# Patient Record
Sex: MALE | Race: ASIAN | Employment: OTHER | URBAN - METROPOLITAN AREA
[De-identification: names, ages, dates, MRNs, and addresses within clinical notes are randomized per-mention and may not be internally consistent; named-entity substitution may affect disease eponyms.]

---

## 2017-01-09 ENCOUNTER — OFFICE VISIT (OUTPATIENT)
Dept: SURGERY | Facility: CLINIC | Age: 82
End: 2017-01-09

## 2017-01-09 VITALS
DIASTOLIC BLOOD PRESSURE: 74 MMHG | SYSTOLIC BLOOD PRESSURE: 122 MMHG | HEIGHT: 66 IN | WEIGHT: 127 LBS | BODY MASS INDEX: 20.41 KG/M2 | RESPIRATION RATE: 16 BRPM | TEMPERATURE: 97 F | HEART RATE: 66 BPM

## 2017-01-09 DIAGNOSIS — D72.819 LEUKOPENIA, UNSPECIFIED TYPE: ICD-10-CM

## 2017-01-09 DIAGNOSIS — D61.818 PANCYTOPENIA (HCC): ICD-10-CM

## 2017-01-09 DIAGNOSIS — I10 ESSENTIAL HYPERTENSION WITH GOAL BLOOD PRESSURE LESS THAN 130/80: ICD-10-CM

## 2017-01-09 DIAGNOSIS — N18.5 CKD (CHRONIC KIDNEY DISEASE), STAGE V (HCC): ICD-10-CM

## 2017-01-09 DIAGNOSIS — E78.2 MIXED HYPERLIPIDEMIA: ICD-10-CM

## 2017-01-09 DIAGNOSIS — N18.6 ESRD (END STAGE RENAL DISEASE) (HCC): ICD-10-CM

## 2017-01-09 DIAGNOSIS — R80.9 NEPHROTIC RANGE PROTEINURIA: ICD-10-CM

## 2017-01-09 DIAGNOSIS — E03.9 HYPOTHYROIDISM, UNSPECIFIED TYPE: ICD-10-CM

## 2017-01-09 DIAGNOSIS — N05.2 MEMBRANOUS GLOMERULONEPHRITIS: ICD-10-CM

## 2017-01-09 DIAGNOSIS — K40.90 INGUINAL HERNIA OF RIGHT SIDE WITHOUT OBSTRUCTION OR GANGRENE: Primary | ICD-10-CM

## 2017-01-09 DIAGNOSIS — D69.6 THROMBOCYTOPENIA (HCC): ICD-10-CM

## 2017-01-09 PROCEDURE — 99204 OFFICE O/P NEW MOD 45 MIN: CPT | Performed by: COLON & RECTAL SURGERY

## 2017-01-09 NOTE — PROGRESS NOTES
Follow Up Visit Note       Active Problems      1. Inguinal hernia of right side without obstruction or gangrene    2. Membranous glomerulonephritis    3. Nephrotic range proteinuria    4. Pancytopenia ([address]    5. Thrombocytopenia ([address]    6.  ESRD (end sta platelets 698. My total face time with this patient was 45 minutes. Greater than half of our visit was spent in counseling the patient on the above listed diagnoses and treatment options. Allergies  Dayne has No Known Allergies.     Past M Constitutional: Negative for fever, chills, diaphoresis, fatigue and unexpected weight change. HENT: Negative for hearing loss, nosebleeds, sore throat and trouble swallowing. Respiratory: Negative for apnea, cough, shortness of breath and wheezing. is definitely bowel contents palpable. Bowel sounds have normal activity normal pitch. Genitourinary: Penis normal. Right testis shows no mass, no swelling and no tenderness. Right testis is descended.  Left testis shows no mass, no swelling and no t that is reducible. The patient is scheduled for an AV fistula later this week. He has been diagnosed with pancytopenia. He has hypertension. 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     Clinical examination reveals his abdomen to be soft, n

## 2017-01-11 ENCOUNTER — PRIOR ORIGINAL RECORDS (OUTPATIENT)
Dept: OTHER | Age: 82
End: 2017-01-11

## 2017-01-11 ENCOUNTER — SURGERY (OUTPATIENT)
Age: 82
End: 2017-01-11

## 2017-01-16 PROBLEM — K40.90 INGUINAL HERNIA OF RIGHT SIDE WITHOUT OBSTRUCTION OR GANGRENE: Status: ACTIVE | Noted: 2017-01-16

## 2017-01-16 NOTE — PATIENT INSTRUCTIONS
I am seeing this patient at the request of the primary care service for a giant right inguinal hernia. He has significant protrusion of the right groin region. He has significant discomfort at the site. He has right testicular pain.     He denies nause hernia with the Prolene system mesh. He should proceed with his arteriovenous fistula. We will schedule the operation here at [facility].  We have him scheduled for another office visit on January 30, 2017.   We will make our final decision ann

## 2017-02-03 ENCOUNTER — MYAURORA ACCOUNT LINK (OUTPATIENT)
Dept: OTHER | Age: 82
End: 2017-02-03

## 2017-02-03 ENCOUNTER — PRIOR ORIGINAL RECORDS (OUTPATIENT)
Dept: OTHER | Age: 82
End: 2017-02-03

## 2017-02-07 LAB
ALBUMIN: 2.3 G/DL
ALKALINE PHOSPHATATE(ALK PHOS): 72 IU/L
BILIRUBIN TOTAL: 0.4 MG/DL
BUN: 30 MG/DL
CALCIUM: 8.1 MG/DL
CHLORIDE: 100 MEQ/L
CREATININE, SERUM: 3.61 MG/DL
GLUCOSE: 80 MG/DL
HEMATOCRIT: 39 %
HEMOGLOBIN: 12.8 G/DL
PLATELETS: 183 K/UL
POTASSIUM, SERUM: 3.3 MEQ/L
PROTEIN, TOTAL: 6.7 G/DL
RED BLOOD COUNT: 4.04 X 10-6/U
SGOT (AST): 20 IU/L
SGPT (ALT): 18 IU/L
SODIUM: 139 MEQ/L
WHITE BLOOD COUNT: 4.9 X 10-3/U

## 2017-02-18 ENCOUNTER — HOSPITAL ENCOUNTER (OUTPATIENT)
Dept: LAB | Facility: HOSPITAL | Age: 82
Discharge: HOME | End: 2017-02-18
Attending: INTERNAL MEDICINE
Payer: MEDICARE

## 2017-02-18 ENCOUNTER — PRIOR ORIGINAL RECORDS (OUTPATIENT)
Dept: OTHER | Age: 82
End: 2017-02-18

## 2017-02-18 ENCOUNTER — HOSPITAL ENCOUNTER (OUTPATIENT)
Dept: CV DIAGNOSTICS | Facility: HOSPITAL | Age: 82
Discharge: HOME | End: 2017-02-18
Attending: INTERNAL MEDICINE
Payer: MEDICARE

## 2017-02-18 DIAGNOSIS — I50.9 CHF (CONGESTIVE HEART FAILURE) (HCC): ICD-10-CM

## 2017-02-18 LAB
ALT SERPL-CCNC: 18 U/L (ref 17–63)
AST SERPL-CCNC: 20 U/L (ref 15–41)
FREE T4: 1.3 NG/DL (ref 0.9–1.8)
TSI SER-ACNC: 7.41 MIU/ML (ref 0.35–5.5)

## 2017-02-18 PROCEDURE — 93017 CV STRESS TEST TRACING ONLY: CPT

## 2017-02-18 PROCEDURE — 84439 ASSAY OF FREE THYROXINE: CPT | Performed by: INTERNAL MEDICINE

## 2017-02-18 PROCEDURE — 84450 TRANSFERASE (AST) (SGOT): CPT | Performed by: INTERNAL MEDICINE

## 2017-02-18 PROCEDURE — 78452 HT MUSCLE IMAGE SPECT MULT: CPT

## 2017-02-18 PROCEDURE — 78452 HT MUSCLE IMAGE SPECT MULT: CPT | Performed by: INTERNAL MEDICINE

## 2017-02-18 PROCEDURE — 36415 COLL VENOUS BLD VENIPUNCTURE: CPT | Performed by: INTERNAL MEDICINE

## 2017-02-18 PROCEDURE — 84460 ALANINE AMINO (ALT) (SGPT): CPT | Performed by: INTERNAL MEDICINE

## 2017-02-18 PROCEDURE — 84443 ASSAY THYROID STIM HORMONE: CPT | Performed by: INTERNAL MEDICINE

## 2017-02-18 PROCEDURE — 93018 CV STRESS TEST I&R ONLY: CPT | Performed by: INTERNAL MEDICINE

## 2017-02-21 LAB
ALT (SGPT): 18 U/L
AST (SGOT): 20 U/L
FREE T4: 1.3 MG/DL
THYROID STIMULATING HORMONE: 7.41 MLU/L

## 2017-02-23 ENCOUNTER — PRIOR ORIGINAL RECORDS (OUTPATIENT)
Dept: OTHER | Age: 82
End: 2017-02-23

## 2017-03-06 PROBLEM — I77.0 AVF (ARTERIOVENOUS FISTULA) (HCC): Status: ACTIVE | Noted: 2017-03-06

## 2017-03-30 ENCOUNTER — TELEPHONE (OUTPATIENT)
Dept: INTERNAL MEDICINE CLINIC | Facility: CLINIC | Age: 82
End: 2017-03-30

## 2017-03-30 RX ORDER — LEVOTHYROXINE SODIUM 137 UG/1
1 CAPSULE ORAL DAILY
Qty: 90 CAPSULE | Refills: 1 | Status: SHIPPED | OUTPATIENT
Start: 2017-03-30 | End: 2017-04-29

## 2017-03-30 NOTE — TELEPHONE ENCOUNTER
Patient is doing pretty good according to daughter, but the hospital had mentioned that thyroid lab was high . She is calling to inquire what should be done. Patients last visit with Dr Alley Pearce was 12/16/2016.   Advised patient should be seen by Dr Alley Pearce. Daughter

## 2017-04-06 ENCOUNTER — TELEPHONE (OUTPATIENT)
Dept: INTERNAL MEDICINE CLINIC | Facility: CLINIC | Age: 82
End: 2017-04-06

## 2017-04-06 NOTE — TELEPHONE ENCOUNTER
Changed the med from capsules to tablets to reduce cost at pharmacy, and conveyed the dose to the home health nurse

## 2017-05-12 RX ORDER — FOLIC ACID/VIT B COMPLEX AND C 0.8 MG
0.8 TABLET ORAL DAILY
Qty: 90 TABLET | Refills: 1 | Status: ON HOLD | OUTPATIENT
Start: 2017-05-12 | End: 2020-01-01

## 2017-06-19 ENCOUNTER — HOSPITAL ENCOUNTER (INPATIENT)
Facility: HOSPITAL | Age: 82
LOS: 1 days | Discharge: HOME | DRG: 299 | End: 2017-06-20
Attending: EMERGENCY MEDICINE | Admitting: HOSPITALIST
Payer: MEDICARE

## 2017-06-19 ENCOUNTER — APPOINTMENT (OUTPATIENT)
Dept: CT IMAGING | Facility: HOSPITAL | Age: 82
DRG: 299 | End: 2017-06-19
Attending: EMERGENCY MEDICINE
Payer: MEDICARE

## 2017-06-19 DIAGNOSIS — I82.B11 SUBCLAVIAN VEIN THROMBOEMBOLISM, ACUTE, RIGHT (HCC): Primary | ICD-10-CM

## 2017-06-19 PROBLEM — R73.9 HYPERGLYCEMIA: Status: ACTIVE | Noted: 2017-06-19

## 2017-06-19 PROBLEM — I82.B19 SUBCLAVIAN VEIN THROMBOEMBOLISM, ACUTE (HCC): Status: ACTIVE | Noted: 2017-06-19

## 2017-06-19 PROBLEM — N17.9 ACUTE KIDNEY INJURY (HCC): Status: ACTIVE | Noted: 2017-06-19

## 2017-06-19 PROBLEM — D64.9 ANEMIA: Status: ACTIVE | Noted: 2017-06-19

## 2017-06-19 PROBLEM — N17.9 ACUTE RENAL FAILURE (ARF) (HCC): Status: ACTIVE | Noted: 2017-06-19

## 2017-06-19 PROCEDURE — 71260 CT THORAX DX C+: CPT | Performed by: EMERGENCY MEDICINE

## 2017-06-20 ENCOUNTER — APPOINTMENT (OUTPATIENT)
Dept: CV DIAGNOSTICS | Facility: HOSPITAL | Age: 82
DRG: 299 | End: 2017-06-20
Attending: HOSPITALIST
Payer: MEDICARE

## 2017-06-20 VITALS
TEMPERATURE: 98 F | HEIGHT: 66 IN | DIASTOLIC BLOOD PRESSURE: 63 MMHG | OXYGEN SATURATION: 95 % | BODY MASS INDEX: 21.36 KG/M2 | SYSTOLIC BLOOD PRESSURE: 133 MMHG | RESPIRATION RATE: 18 BRPM | WEIGHT: 132.94 LBS | HEART RATE: 71 BPM

## 2017-06-20 PROBLEM — I82.B11: Status: ACTIVE | Noted: 2017-06-20

## 2017-06-20 PROCEDURE — 99222 1ST HOSP IP/OBS MODERATE 55: CPT | Performed by: INTERNAL MEDICINE

## 2017-06-20 PROCEDURE — 93306 TTE W/DOPPLER COMPLETE: CPT | Performed by: HOSPITALIST

## 2017-06-20 PROCEDURE — 99223 1ST HOSP IP/OBS HIGH 75: CPT | Performed by: HOSPITALIST

## 2017-06-20 RX ORDER — HEPARIN SODIUM AND DEXTROSE 10000; 5 [USP'U]/100ML; G/100ML
18 INJECTION INTRAVENOUS ONCE
Status: COMPLETED | OUTPATIENT
Start: 2017-06-20 | End: 2017-06-20

## 2017-06-20 RX ORDER — ASCORBIC ACID, THIAMINE, RIBOFLAVIN, NIACINAMIDE, PYRIDOXINE, FOLIC ACID, COBALAMIN, BIOTIN, PANTOTHENIC ACID 100; 1.5; 1.7; 20; 10; 1; 6; 300; 1 MG/1; MG/1; MG/1; MG/1; MG/1; MG/1; UG/1; UG/1; MG/1
1 TABLET, COATED ORAL DAILY
Status: DISCONTINUED | OUTPATIENT
Start: 2017-06-20 | End: 2017-06-20

## 2017-06-20 RX ORDER — ACETAMINOPHEN 325 MG/1
650 TABLET ORAL EVERY 6 HOURS PRN
Status: DISCONTINUED | OUTPATIENT
Start: 2017-06-20 | End: 2017-06-20

## 2017-06-20 RX ORDER — ONDANSETRON 2 MG/ML
4 INJECTION INTRAMUSCULAR; INTRAVENOUS EVERY 6 HOURS PRN
Status: DISCONTINUED | OUTPATIENT
Start: 2017-06-20 | End: 2017-06-20

## 2017-06-20 RX ORDER — HEPARIN SODIUM AND DEXTROSE 10000; 5 [USP'U]/100ML; G/100ML
INJECTION INTRAVENOUS CONTINUOUS
Status: DISCONTINUED | OUTPATIENT
Start: 2017-06-20 | End: 2017-06-20

## 2017-06-20 RX ORDER — HEPARIN SODIUM 5000 [USP'U]/ML
80 INJECTION INTRAVENOUS; SUBCUTANEOUS ONCE
Status: COMPLETED | OUTPATIENT
Start: 2017-06-20 | End: 2017-06-20

## 2017-06-20 RX ORDER — ATORVASTATIN CALCIUM 40 MG/1
40 TABLET, FILM COATED ORAL NIGHTLY
Status: DISCONTINUED | OUTPATIENT
Start: 2017-06-20 | End: 2017-06-20

## 2017-06-20 RX ORDER — LEVOTHYROXINE SODIUM 0.12 MG/1
125 TABLET ORAL
Status: DISCONTINUED | OUTPATIENT
Start: 2017-06-20 | End: 2017-06-20

## 2017-06-20 RX ORDER — METOPROLOL SUCCINATE 50 MG/1
50 TABLET, EXTENDED RELEASE ORAL
Status: DISCONTINUED | OUTPATIENT
Start: 2017-06-20 | End: 2017-06-20

## 2017-06-20 NOTE — CONSULTS
Cardiology Consult Note     PRIMARY CARDIOLOGIST: BOB/SANJAY      CONSULT FOR: NONSUSTAINED VTACHY, HTN, HYPERCHOL, RENAL FAILURE ON DIALYSIS       HISTORY: 86 Y/O MALE WITH HX OF HTN, HYPERCHOL AND RENAL FAILURE ON DIALYSIS.  ADMIT FOR SUBCLAVIAN VEIN THRO

## 2017-06-20 NOTE — H&P
[facility]  History and Physical     Yisel Figures Patient Status:  Emergency    1932 MRN [id number]   Location [facility] Attending Severiano Ching, MD   Hosp Day # 0 PCP Kasandra Arana MD     Chief Complaint: Left Levothyroxine Sodium 125 MCG Oral Tab Take 1 tablet (125 mcg total) by mouth before breakfast. Disp: 90 tablet Rfl: 2   Metoprolol Succinate ER 50 MG Oral Tablet 24 Hr Take 1 tablet (50 mg total) by mouth daily.  Disp: 90 tablet Rfl: 0   HYDROcodone-aceta data reviewed in Epic. ASSESSMENT / PLAN:     1. Right subclavian DVT nonocclusive thrombus-start heparin IV and vascular surgery evaluation. 2. Recent right arm brachiocephalic AV fistula created for dialysis and recent swelling.   Fistulogram plans

## 2017-06-20 NOTE — PLAN OF CARE
Pt admitted at [place] [address]. Safety precautions initiated. Bed in low position. Call light within reach. Admission navigator completed. Daughter at bedside. Pt and daughter updated of plan of care. Will continue to monitor.

## 2017-06-20 NOTE — PROGRESS NOTES
Patient seen and examined. Medically clear to discharge today. Pt has no complaints. Discussed with vascular. Near occlusion likely chronic. No AC necessary. He has complete occlusion of inominate artery. Ok to resume HD using AVF of right arm.   Can d/c

## 2017-06-20 NOTE — CONSULTS
[facility]  Report of Consultation    Waqas Rojas Patient Status:  Inpatient    1932 MRN [id number]   [facility] 7NE-A Attending Roswell Sicard, MD   Hosp Day # 1 PCP Renea Myers MD       Assessment / Plan:    1) ESRD- due to bi any smokeless tobacco history on file.     Allergies:  No Known Allergies    Medications:    Current facility-administered medications:   •  atorvastatin (LIPITOR) tab 40 mg, 40 mg, Oral, Nightly  •  Levothyroxine Sodium (SYNTHROID, LEVOTHROID) tab 125 mcg, 06/20/2017    06/20/2017   CO2 28.0 06/20/2017   GLU 75 06/20/2017   CA 8.3 06/20/2017   ALB 2.3 06/19/2017   ALKPHO 70 06/19/2017   BILT 0.4 06/19/2017   TP 7.1 06/19/2017   AST 20 06/19/2017   ALT 32 06/19/2017   .8 06/20/2017   INR 1.08 06/

## 2017-06-20 NOTE — PROGRESS NOTES
Discharge instructions reviewed with patient and daughter. All questions were answered. Medications and follow-ups reviewed. All questions were answered concerning discharge plan. ACE wrap placed on right arm to help reduce swelling.  Encouraged not to slee

## 2017-06-20 NOTE — PROGRESS NOTES
PTT this .8. Per orders, hold for 1 hour, then decrease rate by 200 units. Will restart at 900 units.  PTT at 3pm

## 2017-06-20 NOTE — PROGRESS NOTES
Called daughter to update her on POC and inform her of discharge plan. No answer, but left voicemail and call back number.

## 2017-06-20 NOTE — ED PROVIDER NOTES
Patient Seen in: [facility] Emergency Department    History   Patient presents with:   Other    Stated Complaint: facial swelling    HPI    Patient is an 26-year-old with a history of hypertension, high cholesterol, chronic kidney disease, hypothyroi Take 1-2 tablets by mouth every 4 (four) hours as needed for Pain.        Family History   Problem Relation Age of Onset   • Hypertension Brother    • Other[other] [OTHER] Brother          Smoking Status: Former Smoker                   Packs/Day: 0.00  Yea within normal limits   CBC WITH DIFFERENTIAL WITH PLATELET    Narrative: The following orders were created for panel order CBC WITH DIFFERENTIAL WITH PLATELET.   Procedure                               Abnormality         Status                     ---- demonstrates nonocclusive thrombus in the right subclavian vein near the junction with the axillary vein likely secondary to his dialysis catheter. He is minimally fairly symptomatic even though the clot is nonocclusive.   Therefore he will be admitted and

## 2017-06-20 NOTE — CONSULTS
[facility]    PATIENT'S NAME: VALERIANO OLIVIER   ATTENDING PHYSICIAN: ROSEMARY Simsis: Heather Lofton M.D.    PATIENT ACCOUNT#:   [de-identified]    LOCATION:  [address] [place] [address]  MEDICAL RECORD #:   [id number]       DATE OF BIRTH:  0 Insignificant for early CAD, per records. REVIEW OF SYSTEMS:  No chest pain. No shortness of breath. Supine in bed not short of breath. He has edematous right arm but no swelling of the lower extremities.   The rest of the review of systems negative x ordered. Thank you very much for allowing me to participate in the care of this patient.     Dictated By Angel Malcolm M.D.  d: 06/20/2017 11:38:18  t: 06/20/2017 13:14:31  [facility] [id number]  TJD/

## 2017-06-20 NOTE — CM/SW NOTE
06/20/17 1100   CM/SW Referral Data   Referral Source Family   Reason for Referral Discharge planning   Informant Children   Pertinent Medical Hx   Primary Care Physician Name Dr Venecia Morris   Date of Last Contact with PCP 4/20/2017   Significant Past Medical/Me

## 2017-06-20 NOTE — ED INITIAL ASSESSMENT (HPI)
Pt to ED brought in by daughter since they were sent by Orquidea Claros - Dr. Layton Wallace to have his \"Angiogram expedited, since he needs it sooner than 6/28/17, as scheduled. \" Per daughter - Lynsey Doan, Pt has narrowing of his artery, thus needing a 2nd angiogram. Pt i

## 2017-06-20 NOTE — PLAN OF CARE
Received report at 0700. Patient alert and oriented x4. No complaints of pain. Primary language is Madarin, but does understand [place]. Heparin drip running at 900 units. PTT this .8. DVT protocol followed.  PTT redraw at 1500 (will update once resul

## 2017-06-20 NOTE — HISTORICAL OFFICE NOTE
MANJIT OLIVIER  : 1932  ACCOUNT:  [id number]  [phone]  PCP:      TODAY'S DATE: 2017  DICTATED BY:  Miah Ayon M.D.]    CHIEF COMPLAINT: [Cardiac Clearance.]    HPI:  [On 2017, Bianca Dejesus, an 80year old male, presented with no interim cardi venous pressure not elevated. RESP: respirations with normal rate and rhythm, clear to auscultation. GI: right inguinal hernia. MS: adequate gait for exercise/testing. EXT: AV fistula. SKIN: no rashes, lesions, ulcers.   NEURO/PSYCH: alert and oriented to

## 2017-06-20 NOTE — ED NOTES
Pt has a bed assigned since 2352, ER MD-Dr. Daniel Toscano made aware and stated Pt is not ready to be admitted at this time. Awaits Vascular Surgeon's callback and for possible anti-coagulation.

## 2017-06-20 NOTE — ED NOTES
Report given to Cayetano Wilson x [phone] at [phone]. Transport paged. Pt and daughter at bedside updated.

## 2017-06-20 NOTE — PROGRESS NOTES
Lab called concerning PTT >300. Informed her that drip was not held (as per protocol) so this is why it was probably high (false result).  Dr. Sr Small aware and okay to discharge

## 2017-06-20 NOTE — CONSULTS
[facility]  Vascular Surgery Consultation    Waqas Rojas Patient Status:  Inpatient    1932 MRN [id number]   [facility] 7NE-A Attending Roswell Sicard, MD   Hosp Day # 1 PCP Renea Myers MD     History of Present Illness:  Christopher High cholesterol    • Cancer [facility])      prostate   • Disorder of thyroid    • Dialysis patient [facility])    • AVF (arteriovenous fistula) ([facility] 3/6/2017         Past Surgical History    CATARACT       Family History   Problem Relation Age of Onset   • Hyperten Laboratory Data:    Lab Results  Component Value Date   WBC 6.1 06/19/2017   HGB 9.8 06/19/2017   HCT 31.0 06/19/2017   .0 06/19/2017   CREATSERUM 7.01 06/20/2017   BUN 82 06/20/2017    06/20/2017   K 4.0 06/20/2017    06/20/2017 well controlled with the sleeve.   The patient could consider ligation of his fistula which would make his arm normal.  This however would require a temporary dialysis catheter on the opposite side and a new fistula in the future this could put him at risk

## 2017-06-21 ENCOUNTER — PATIENT OUTREACH (OUTPATIENT)
Dept: CASE MANAGEMENT | Age: 82
End: 2017-06-21

## 2017-06-21 ENCOUNTER — TELEPHONE (OUTPATIENT)
Dept: INTERNAL MEDICINE CLINIC | Facility: CLINIC | Age: 82
End: 2017-06-21

## 2017-06-21 DIAGNOSIS — N18.6 ESRD (END STAGE RENAL DISEASE) (HCC): ICD-10-CM

## 2017-06-21 DIAGNOSIS — D63.1 ANEMIA IN CHRONIC KIDNEY DISEASE: Primary | ICD-10-CM

## 2017-06-21 DIAGNOSIS — N18.9 ANEMIA IN CHRONIC KIDNEY DISEASE: Primary | ICD-10-CM

## 2017-06-21 DIAGNOSIS — I82.B11 SUBCLAVIAN VEIN THROMBOEMBOLISM, ACUTE, RIGHT (HCC): ICD-10-CM

## 2017-06-21 DIAGNOSIS — I77.0 AVF (ARTERIOVENOUS FISTULA) (HCC): ICD-10-CM

## 2017-06-21 NOTE — PROGRESS NOTES
Initial Post Discharge Follow Up   Discharge Date: 6/20/17  Contact Date: 6/21/2017    Consent Verification:  Assessment Completed With: Other: Augusto patient's daughter Permission received per patient?  written  HIPAA Verified?   Yes    Discharge Dx: Metoprolol Succinate ER was changed to 12.5 mg daily. Message sent to PCP for clarification.   • When you were leaving the hospital were any medication changes discussed with you? yes  • May I go over your medications with you to make sure we are not adalid call the doctor and when to call 911. [facility] sent a message to PCP to clarify medication dosages of Levothyroxine and Metoprolol. NCM instructed pt's daughter to call PCP's office with any questions or needs, Augusto states she will.     [x]  Discharge [facility]

## 2017-06-21 NOTE — TELEPHONE ENCOUNTER
Patient discharged from [facility] on 6/20/17 per AVS patient is to be taking Levothyroxine Sodium 125 mcg 1 tab by mouth daily before breakfast and Metoprolol Succinate ER 50 mg take 1 tablet by mouth daily    Per Augusto (patient's daughter) burke

## 2017-06-21 NOTE — CM/SW NOTE
06/21/17 0900   Discharge disposition   Discharged to: Home or Self   Name of [address] [place] [address] services after discharge Senior services;Employed caregiver   Discharge transportation [place]

## 2017-06-22 NOTE — DISCHARGE SUMMARY
[facility]  DISCHARGE SUMMARY     Rose Ordaz Patient Status:  Inpatient    1932 MRN [id number]   [facility] 7NE-A Attending No att. providers found   Hosp Day # 1 PCP Martina Rodriguez MD     Date of Admission: 2017  Date of intervention. He had short burst of NSVT while inpatient and was seen by cardiology service. His echo was unchanged. He will continue on BB for now. He was stable for discharge home. He will continue to use his AVF for HD.   [facility]

## 2017-06-29 RX ORDER — LEVOTHYROXINE SODIUM 137 UG/1
1 CAPSULE ORAL
Qty: 30 CAPSULE | Refills: 0 | Status: SHIPPED | OUTPATIENT
Start: 2017-06-29 | End: 2017-07-29

## 2017-06-29 NOTE — TELEPHONE ENCOUNTER
Talked to daughter regarding father medical condition.  Due to long distance to [facility] and patient  needing frequent hospitalization at [facility], they are planning to switch PCP to Dr. Dayanna Frey

## 2017-08-02 RX ORDER — LEVOTHYROXINE SODIUM 125 UG/1
1 CAPSULE ORAL DAILY
Qty: 90 CAPSULE | Refills: 1 | Status: SHIPPED | OUTPATIENT
Start: 2017-08-02 | End: 2017-09-01

## 2017-08-03 ENCOUNTER — TELEPHONE (OUTPATIENT)
Dept: INTERNAL MEDICINE CLINIC | Facility: CLINIC | Age: 82
End: 2017-08-03

## 2017-12-16 ENCOUNTER — ANESTHESIA EVENT (OUTPATIENT)
Dept: SURGERY | Facility: HOSPITAL | Age: 82
DRG: 350 | End: 2017-12-16
Payer: MEDICARE

## 2017-12-16 ENCOUNTER — ANESTHESIA (OUTPATIENT)
Dept: SURGERY | Facility: HOSPITAL | Age: 82
DRG: 350 | End: 2017-12-16
Payer: MEDICARE

## 2017-12-16 ENCOUNTER — HOSPITAL ENCOUNTER (INPATIENT)
Facility: HOSPITAL | Age: 82
LOS: 2 days | Discharge: HOME | DRG: 350 | End: 2017-12-18
Attending: EMERGENCY MEDICINE | Admitting: STUDENT IN AN ORGANIZED HEALTH CARE EDUCATION/TRAINING PROGRAM
Payer: MEDICARE

## 2017-12-16 ENCOUNTER — SURGERY (OUTPATIENT)
Age: 82
End: 2017-12-16

## 2017-12-16 DIAGNOSIS — K40.90: ICD-10-CM

## 2017-12-16 DIAGNOSIS — K40.41: ICD-10-CM

## 2017-12-16 DIAGNOSIS — K40.31 RECURRENT UNILATERAL INGUINAL HERNIA WITH OBSTRUCTION AND WITHOUT GANGRENE: Primary | ICD-10-CM

## 2017-12-16 PROCEDURE — 0YU50JZ SUPPLEMENT RIGHT INGUINAL REGION WITH SYNTHETIC SUBSTITUTE, OPEN APPROACH: ICD-10-PCS | Performed by: SURGERY

## 2017-12-16 PROCEDURE — 99223 1ST HOSP IP/OBS HIGH 75: CPT | Performed by: STUDENT IN AN ORGANIZED HEALTH CARE EDUCATION/TRAINING PROGRAM

## 2017-12-16 PROCEDURE — 99223 1ST HOSP IP/OBS HIGH 75: CPT | Performed by: SURGERY

## 2017-12-16 DEVICE — BARD MESH PERFIX PLUG, LARGE
Type: IMPLANTABLE DEVICE | Site: INGUINAL | Status: FUNCTIONAL
Brand: BARD MESH PERFIX PLUG

## 2017-12-16 RX ORDER — MEPERIDINE HYDROCHLORIDE 25 MG/ML
12.5 INJECTION INTRAMUSCULAR; INTRAVENOUS; SUBCUTANEOUS AS NEEDED
Status: DISCONTINUED | OUTPATIENT
Start: 2017-12-16 | End: 2017-12-16 | Stop reason: HOSPADM

## 2017-12-16 RX ORDER — HYDROMORPHONE HYDROCHLORIDE 1 MG/ML
0.5 INJECTION, SOLUTION INTRAMUSCULAR; INTRAVENOUS; SUBCUTANEOUS EVERY 30 MIN PRN
Status: ACTIVE | OUTPATIENT
Start: 2017-12-16 | End: 2017-12-16

## 2017-12-16 RX ORDER — ONDANSETRON 2 MG/ML
4 INJECTION INTRAMUSCULAR; INTRAVENOUS EVERY 4 HOURS PRN
Status: DISCONTINUED | OUTPATIENT
Start: 2017-12-16 | End: 2017-12-18

## 2017-12-16 RX ORDER — SODIUM CHLORIDE 9 MG/ML
INJECTION, SOLUTION INTRAVENOUS CONTINUOUS
Status: ACTIVE | OUTPATIENT
Start: 2017-12-16 | End: 2017-12-16

## 2017-12-16 RX ORDER — HYDROCODONE BITARTRATE AND ACETAMINOPHEN 5; 325 MG/1; MG/1
2 TABLET ORAL AS NEEDED
Status: DISCONTINUED | OUTPATIENT
Start: 2017-12-16 | End: 2017-12-16 | Stop reason: HOSPADM

## 2017-12-16 RX ORDER — HYDROMORPHONE HYDROCHLORIDE 1 MG/ML
0.2 INJECTION, SOLUTION INTRAMUSCULAR; INTRAVENOUS; SUBCUTANEOUS EVERY 2 HOUR PRN
Status: DISCONTINUED | OUTPATIENT
Start: 2017-12-16 | End: 2017-12-18

## 2017-12-16 RX ORDER — ALBUTEROL SULFATE 2.5 MG/3ML
2.5 SOLUTION RESPIRATORY (INHALATION) ONCE AS NEEDED
Status: DISCONTINUED | OUTPATIENT
Start: 2017-12-16 | End: 2017-12-16 | Stop reason: HOSPADM

## 2017-12-16 RX ORDER — HYDROCODONE BITARTRATE AND ACETAMINOPHEN 5; 325 MG/1; MG/1
1 TABLET ORAL EVERY 4 HOURS PRN
Status: DISCONTINUED | OUTPATIENT
Start: 2017-12-16 | End: 2017-12-18

## 2017-12-16 RX ORDER — HYDROMORPHONE HYDROCHLORIDE 1 MG/ML
0.5 INJECTION, SOLUTION INTRAMUSCULAR; INTRAVENOUS; SUBCUTANEOUS
Status: DISCONTINUED | OUTPATIENT
Start: 2017-12-16 | End: 2017-12-18

## 2017-12-16 RX ORDER — LEVOTHYROXINE SODIUM 137 UG/1
137 TABLET ORAL
Status: ON HOLD | COMMUNITY
End: 2019-01-01

## 2017-12-16 RX ORDER — ONDANSETRON 2 MG/ML
4 INJECTION INTRAMUSCULAR; INTRAVENOUS EVERY 6 HOURS PRN
Status: DISCONTINUED | OUTPATIENT
Start: 2017-12-16 | End: 2017-12-18

## 2017-12-16 RX ORDER — HYDROMORPHONE HYDROCHLORIDE 1 MG/ML
INJECTION, SOLUTION INTRAMUSCULAR; INTRAVENOUS; SUBCUTANEOUS
Status: COMPLETED
Start: 2017-12-16 | End: 2017-12-16

## 2017-12-16 RX ORDER — HYDROCODONE BITARTRATE AND ACETAMINOPHEN 5; 325 MG/1; MG/1
1 TABLET ORAL AS NEEDED
Status: DISCONTINUED | OUTPATIENT
Start: 2017-12-16 | End: 2017-12-16 | Stop reason: HOSPADM

## 2017-12-16 RX ORDER — HYDROCODONE BITARTRATE AND ACETAMINOPHEN 5; 325 MG/1; MG/1
2 TABLET ORAL EVERY 4 HOURS PRN
Status: DISCONTINUED | OUTPATIENT
Start: 2017-12-16 | End: 2017-12-18

## 2017-12-16 RX ORDER — HEPARIN SODIUM 5000 [USP'U]/ML
5000 INJECTION, SOLUTION INTRAVENOUS; SUBCUTANEOUS EVERY 12 HOURS SCHEDULED
Status: DISCONTINUED | OUTPATIENT
Start: 2017-12-16 | End: 2017-12-18

## 2017-12-16 RX ORDER — HYDROMORPHONE HYDROCHLORIDE 1 MG/ML
0.8 INJECTION, SOLUTION INTRAMUSCULAR; INTRAVENOUS; SUBCUTANEOUS EVERY 2 HOUR PRN
Status: DISCONTINUED | OUTPATIENT
Start: 2017-12-16 | End: 2017-12-18

## 2017-12-16 RX ORDER — ONDANSETRON 2 MG/ML
4 INJECTION INTRAMUSCULAR; INTRAVENOUS AS NEEDED
Status: DISCONTINUED | OUTPATIENT
Start: 2017-12-16 | End: 2017-12-16 | Stop reason: HOSPADM

## 2017-12-16 RX ORDER — SODIUM CHLORIDE 9 MG/ML
INJECTION, SOLUTION INTRAVENOUS CONTINUOUS
Status: DISCONTINUED | OUTPATIENT
Start: 2017-12-16 | End: 2017-12-18

## 2017-12-16 RX ORDER — METOCLOPRAMIDE HYDROCHLORIDE 5 MG/ML
10 INJECTION INTRAMUSCULAR; INTRAVENOUS AS NEEDED
Status: DISCONTINUED | OUTPATIENT
Start: 2017-12-16 | End: 2017-12-16 | Stop reason: HOSPADM

## 2017-12-16 RX ORDER — SODIUM CHLORIDE, SODIUM LACTATE, POTASSIUM CHLORIDE, CALCIUM CHLORIDE 600; 310; 30; 20 MG/100ML; MG/100ML; MG/100ML; MG/100ML
INJECTION, SOLUTION INTRAVENOUS CONTINUOUS
Status: DISCONTINUED | OUTPATIENT
Start: 2017-12-16 | End: 2017-12-16 | Stop reason: HOSPADM

## 2017-12-16 RX ORDER — HYDROMORPHONE HYDROCHLORIDE 1 MG/ML
0.4 INJECTION, SOLUTION INTRAMUSCULAR; INTRAVENOUS; SUBCUTANEOUS EVERY 2 HOUR PRN
Status: DISCONTINUED | OUTPATIENT
Start: 2017-12-16 | End: 2017-12-18

## 2017-12-16 RX ORDER — ACETAMINOPHEN 325 MG/1
650 TABLET ORAL EVERY 6 HOURS PRN
Status: DISCONTINUED | OUTPATIENT
Start: 2017-12-16 | End: 2017-12-18

## 2017-12-16 RX ORDER — METOCLOPRAMIDE HYDROCHLORIDE 5 MG/ML
5 INJECTION INTRAMUSCULAR; INTRAVENOUS EVERY 8 HOURS PRN
Status: DISCONTINUED | OUTPATIENT
Start: 2017-12-16 | End: 2017-12-18

## 2017-12-16 RX ORDER — BUPIVACAINE HYDROCHLORIDE 2.5 MG/ML
INJECTION, SOLUTION EPIDURAL; INFILTRATION; INTRACAUDAL AS NEEDED
Status: DISCONTINUED | OUTPATIENT
Start: 2017-12-16 | End: 2017-12-16 | Stop reason: HOSPADM

## 2017-12-16 RX ORDER — MIDAZOLAM HYDROCHLORIDE 1 MG/ML
1 INJECTION INTRAMUSCULAR; INTRAVENOUS EVERY 5 MIN PRN
Status: DISCONTINUED | OUTPATIENT
Start: 2017-12-16 | End: 2017-12-16 | Stop reason: HOSPADM

## 2017-12-16 RX ORDER — NALOXONE HYDROCHLORIDE 0.4 MG/ML
80 INJECTION, SOLUTION INTRAMUSCULAR; INTRAVENOUS; SUBCUTANEOUS AS NEEDED
Status: DISCONTINUED | OUTPATIENT
Start: 2017-12-16 | End: 2017-12-16 | Stop reason: HOSPADM

## 2017-12-16 RX ORDER — HYDROMORPHONE HYDROCHLORIDE 1 MG/ML
0.4 INJECTION, SOLUTION INTRAMUSCULAR; INTRAVENOUS; SUBCUTANEOUS EVERY 5 MIN PRN
Status: DISCONTINUED | OUTPATIENT
Start: 2017-12-16 | End: 2017-12-16 | Stop reason: HOSPADM

## 2017-12-16 RX ORDER — CEFOXITIN 1 G/1
INJECTION, POWDER, FOR SOLUTION INTRAVENOUS
Status: DISCONTINUED | OUTPATIENT
Start: 2017-12-16 | End: 2017-12-16 | Stop reason: HOSPADM

## 2017-12-16 RX ORDER — HYDROMORPHONE HYDROCHLORIDE 1 MG/ML
0.5 INJECTION, SOLUTION INTRAMUSCULAR; INTRAVENOUS; SUBCUTANEOUS ONCE
Status: COMPLETED | OUTPATIENT
Start: 2017-12-16 | End: 2017-12-16

## 2017-12-16 RX ORDER — ONDANSETRON 2 MG/ML
4 INJECTION INTRAMUSCULAR; INTRAVENOUS ONCE
Status: COMPLETED | OUTPATIENT
Start: 2017-12-16 | End: 2017-12-16

## 2017-12-16 NOTE — ANESTHESIA PREPROCEDURE EVALUATION
PRE-OP EVALUATION    Patient Name: Fariha Hendrickson    Pre-op Diagnosis: Hernia, inguinal, unilateral [K40.90]    Procedure(s):  Repair incarcerated unilateral hernia    Surgeon(s) and Role:     * Nova Caller, DO - Primary    Pre-op vitals reviewed.   Temp: 9 ([facility]     ESRD (end stage renal disease) ([facility]     Nephrotic range proteinuria     Inguinal hernia of right side without obstruction or gangrene     AVF (arteriovenous fistula) ([facility]     Anemia     Acute renal failure (ARF) (HCC)     Acute kidney injury (H

## 2017-12-16 NOTE — BRIEF OP NOTE
Pre-Operative Diagnosis: incarcerated right inguinal hernia     Post-Operative Diagnosis: incarcerated right inguinal hernia with ischemic small intestine     Procedure Performed:   Procedure(s):  Repair incarcerated right inguinal  with mesh    Surgeon

## 2017-12-16 NOTE — ED INITIAL ASSESSMENT (HPI)
Pt c/o mid abdominal pain since last night, vomiting this morning.   Pt has chronic diarrhea, pt saw his PMD today and sent to ER for further eval.

## 2017-12-16 NOTE — ED PROVIDER NOTES
Patient Seen in: [facility] Emergency Department    History   Patient presents with:  Abdomen/Flank Pain (GI/)  Nausea/Vomiting/Diarrhea (gastrointestinal)    Stated Complaint: abdominal pain, vomiting    HPI    80-year-old male who presents here bed holding a bucket due to vomiting. Complaining of groin pain. HEENT: Pupils are equal reactive to light. Extra ocular motions are intact. No scleral icterus or conjunctival pallor. Skin appears slightly pale.   Oral mucosa was moist.  Lungs: Clear t Glucose 159 BUN 49 creatinine of 5.5. Chloride of 99. White count 13,200. The patient appears to have an incarcerated and possibly strangulated hernia. I spoke to the patient and his daughter and told him of the need for emergent surgery.   The stephen

## 2017-12-16 NOTE — ED NOTES
Spoke to Abby Alves. Transport coming for patient in 15 minutes. Discussed POC with patients daughter.

## 2017-12-16 NOTE — CONSULTS
Ezio Morton is a 80year old male  Patient presents with:  Abdomen/Flank Pain (GI/)  Nausea/Vomiting/Diarrhea (gastrointestinal)      REFERRED BY    Patient presents with hernia Right inguinal present for over one year   Pt was previously able to reduce negative,   EYES , no diplopia or vision changes  RESPIRATORY: denies shortness of breath, wheezing or cough   CARDIOVASCULAR: denies chest pain or EDOUARD; no palpitations   GI: denies nausea, vomiting, constipation, diarrhea; no rectal bleeding; no heartburn mg/dL Final   • Alkaline Phosphatase 06/19/2017 70  45 - 117 U/L Final   • AST 06/19/2017 20  15 - 41 U/L Final   • Alt 06/19/2017 32  17 - 63 U/L Final   • Bilirubin, Total 06/19/2017 0.4  0.1 - 2.0 mg/dL Final   • Total Protein 06/19/2017 7.1  6.1 - 8.3 • BUN 06/20/2017 82* 8 - 20 mg/dL Final   • Creatinine 06/20/2017 7.01* 0.70 - 1.30 mg/dL Final   • GFR 06/20/2017 6* >=60 Final   • Calcium, Total 06/20/2017 8.3  8.3 - 10.3 mg/dL Final   • Sodium 06/20/2017 141  136 - 144 mmol/L Final   • Potassium 06/

## 2017-12-16 NOTE — H&P
[facility]  History and Physical     Yisel Figures Patient Status:  Emergency    1932 MRN [id number]   Location 6[facility] Attending Greta Remy MD   Hosp Day # 0 PCP Libra Tucker MD     Chief Complaint: [place] Ergocalciferol (VITAMIN D2) 2000 UNITS Oral Tab Take by mouth. Disp:  Rfl:    Atorvastatin Calcium 40 MG Oral Tab Take 1 tablet (40 mg total) by mouth nightly.  Disp: 90 tablet Rfl: 2       Review of Systems:   A comprehensive 14 point review of systems w care discussed with pt dtr.     Micah Busby MD  12/16/2017

## 2017-12-16 NOTE — ANESTHESIA PREPROCEDURE EVALUATION
PRE-OP EVALUATION    Patient Name: Waqas Bob    Pre-op Diagnosis: Hernia, inguinal, unilateral [K40.90]    Procedure(s):  Repair incarcerated unilateral hernia    Surgeon(s) and Role:     * Lan Manual, DO - Primary    Pre-op vitals reviewed.   Temp: 9 V ([facility] [address]     ESRD (end stage renal disease) ([address]     Nephrotic range proteinuria     Inguinal hernia of right side without obstruction or gangrene     AVF (arteriovenous fistula) ([address]     Anemia     Acute renal failure (ARF) (HCC)     Acute kidney injury injury,and hoarseness from airway management. All questions were answered and understanding was demonstrated of risks. Informed permission was obtained to proceed as documented in the signed consent form.       Patient overall has increased anesthesia ris

## 2017-12-17 PROCEDURE — 99222 1ST HOSP IP/OBS MODERATE 55: CPT | Performed by: INTERNAL MEDICINE

## 2017-12-17 PROCEDURE — 99232 SBSQ HOSP IP/OBS MODERATE 35: CPT | Performed by: STUDENT IN AN ORGANIZED HEALTH CARE EDUCATION/TRAINING PROGRAM

## 2017-12-17 RX ORDER — LIDOCAINE AND PRILOCAINE 25; 25 MG/G; MG/G
CREAM TOPICAL AS NEEDED
Status: DISCONTINUED | OUTPATIENT
Start: 2017-12-17 | End: 2017-12-18

## 2017-12-17 RX ORDER — ALBUMIN (HUMAN) 12.5 G/50ML
100 SOLUTION INTRAVENOUS AS NEEDED
Status: DISCONTINUED | OUTPATIENT
Start: 2017-12-17 | End: 2017-12-18

## 2017-12-17 NOTE — ANESTHESIA POSTPROCEDURE EVALUATION
[facility] Patient Status:  Emergency   Age/Gender 80year old male MRN [id number]   Location [facility] Attending [facility] Part, [facility] Day # 0 PCP Berna Kelley MD       Anesthesia Post-op Note    Proce

## 2017-12-17 NOTE — SLP NOTE
Orders were received for a bedside swallowing evaluation per RN navigator questions and patient being on a modified diet at home. After chart review and discussion with the RN, patient does not have a dysphagia.  He has poor fitting dentures and chooses to

## 2017-12-17 NOTE — CONSULTS
[facility]  Report of Consultation    Elena Simon Patient Status:  Inpatient    1932 MRN [id number]   [facility] 3NW-A Attending Shi Hermosillo, DO   Hosp Day # 1 PCP Jessica Sierra MD     Reason for Consultation:  ESRD    History of Metoclopramide HCl (REGLAN) injection 5 mg, 5 mg, Intravenous, Q8H PRN  •  acetaminophen (TYLENOL) tab 650 mg, 650 mg, Oral, Q6H PRN  •  ondansetron HCl (ZOFRAN) injection 4 mg, 4 mg, Intravenous, Q6H PRN  •  HYDROcodone-acetaminophen (NORCO) 5-325 MG per 06/20/2017 82 (H)   ----------  Creatinine (mg/dL)   Date Value   12/17/2017 6.75 (H)   12/16/2017 5.58 (H)   06/20/2017 7.01 (H)   ----------      Imaging:  Reviewed    Impression:  1. Incarcerated inguinal hernia - s/p repair; per surgery  2.  ESRD - re

## 2017-12-17 NOTE — OPERATIVE REPORT
[address] [place] [place]    PATIENT'S NAME: VALERIANO Tarango   ATTENDING PHYSICIAN: Rafael Peters D.O.   OPERATING PHYSICIAN: Rafael Peters D.O.   PATIENT ACCOUNT#:   [id number]    LOCATION:  [address] [place] [address]  MEDICAL RECORD #:   [id number]       DATE OF BIRTH:  [birth year] thin bloody fluid that was suctioned. The patient had evidence of some clots within the hernial sac and a single loop of small intestine which was brought up into the incision.   After the small intestine was released of its confining stricture at the exte

## 2017-12-17 NOTE — PROGRESS NOTES
[facility]  Progress Note     Zamzam Hummel Patient Status:  Inpatient    1932 MRN [id number]   [facility] 3NW-A Attending Luisa Barton,    Hosp Day # 1 PCP Chato Escobar MD     Chief Complaint: inguinal pain     S: Patient  Do Before breakfast       ASSESSMENT / PLAN:     1. Incarcerated hernia   1. Sx on cs- s/p repair POD 1  2. Anti emetics  3. IVF  4. Pain control  5. Adv diet as tolerated   2. ESRD on HD  1. Renal on cs   3. HTN  4.  HLD        Plan of care:   As above     Huma Goncalves

## 2017-12-17 NOTE — PROGRESS NOTES
HANDOFF RECEIVED ON PATIENT AT [address] [place] [address] FROM PEG ALLEN.  PATIENT IS SALINE LOCKED, ON ROOM AIR, PRIMARILY MANDARIN SPEAKING BUT DOES UNDERSTAND/SPEAK SOME ENGLISH, BED ALARM IN PLACE, ON ROOM AIR, ANURIC DUE TO HEMODIALYSIS, AV FISTULA POSITIVE FOR THRILL AND BRUI

## 2017-12-17 NOTE — PLAN OF CARE
GASTROINTESTINAL - ADULT    • Maintains or returns to baseline bowel function Progressing          METABOLIC/FLUID AND ELECTROLYTES - ADULT    • Electrolytes maintained within normal limits Progressing    • Hemodynamic stability and optimal renal function

## 2017-12-17 NOTE — PROGRESS NOTES
NURSING ADMISSION NOTE      Patient admitted via bed. Oriented to room. Safety precautions initiated. Bed in low position. Call light in reach. Patient reports he speaks Mandarin.  services used for assessment.  Writing RN spoke with

## 2017-12-17 NOTE — PROGRESS NOTES
Counts include 234 beds at the [facility] Pharmacy Note:  Renal Dose Adjustment for Metoclopramide (REGLAN)    Waqas Prudent has been prescribed Metoclopramide (REGLAN) 10 mg every 8 hours as needed for nausea and vomiting.     Estimated Creatinine Clearance: 8.6 mL/min (based on SCr of 5.58 mg/d

## 2017-12-17 NOTE — PROGRESS NOTES
[facility]  Progress Note    Sabi Silver Patient Status:  Inpatient    1932 MRN [id number]   [facility] 3NW-A Attending Rachid Koch, DO   Hosp Day # 1 PCP Mitch Mejia MD     Subjective:  Pt resting in bed, denies any pain, tani

## 2017-12-17 NOTE — PLAN OF CARE
GASTROINTESTINAL - ADULT    • Maintains or returns to baseline bowel function Progressing        METABOLIC/FLUID AND ELECTROLYTES - ADULT    • Electrolytes maintained within normal limits Progressing    • Hemodynamic stability and optimal renal function ma

## 2017-12-18 VITALS
RESPIRATION RATE: 16 BRPM | OXYGEN SATURATION: 95 % | SYSTOLIC BLOOD PRESSURE: 103 MMHG | WEIGHT: 136 LBS | BODY MASS INDEX: 22 KG/M2 | TEMPERATURE: 98 F | DIASTOLIC BLOOD PRESSURE: 48 MMHG | HEART RATE: 74 BPM

## 2017-12-18 PROBLEM — N18.6 ANEMIA IN ESRD (END-STAGE RENAL DISEASE) (HCC): Status: ACTIVE | Noted: 2017-06-19

## 2017-12-18 PROBLEM — D63.1 ANEMIA IN ESRD (END-STAGE RENAL DISEASE) (HCC): Status: ACTIVE | Noted: 2017-06-19

## 2017-12-18 PROCEDURE — 90935 HEMODIALYSIS ONE EVALUATION: CPT | Performed by: INTERNAL MEDICINE

## 2017-12-18 PROCEDURE — 99239 HOSP IP/OBS DSCHRG MGMT >30: CPT | Performed by: STUDENT IN AN ORGANIZED HEALTH CARE EDUCATION/TRAINING PROGRAM

## 2017-12-18 PROCEDURE — 5A1D70Z PERFORMANCE OF URINARY FILTRATION, INTERMITTENT, LESS THAN 6 HOURS PER DAY: ICD-10-PCS | Performed by: INTERNAL MEDICINE

## 2017-12-18 RX ORDER — DOCUSATE SODIUM 100 MG/1
100 CAPSULE, LIQUID FILLED ORAL DAILY
Qty: 30 CAPSULE | Refills: 0 | Status: SHIPPED | OUTPATIENT
Start: 2017-12-18 | End: 2019-01-01

## 2017-12-18 RX ORDER — HYDROCODONE BITARTRATE AND ACETAMINOPHEN 5; 325 MG/1; MG/1
1-2 TABLET ORAL
Qty: 30 TABLET | Refills: 0 | Status: SHIPPED | OUTPATIENT
Start: 2017-12-18 | End: 2019-01-01

## 2017-12-18 NOTE — DISCHARGE SUMMARY
[facility]  DISCHARGE SUMMARY     Socorro Shah Patient Status:  Inpatient    1932 MRN [id number]   [facility] 3NW-A Attending Shae Chong, DO   Hosp Day # 2 PCP Azael Boyd MD     Date of Admission: 2017  Date of [address]  Incidental or significant findings and recommendations (brief descriptions):  • no    Lab/Test results pending at Discharge:   · no    Consultants:  • Surgery    Discharge Medication List:     Discharge Medications      START taking these medications DO  [address] [place] [id number] [phone]    Schedule an appointment as soon as possible for a visit in 1 week  For post op follow up, You may see a PA (Severo Child or Harrison Comment)      Vital signs:  Temp:  [98 °F (36.7 °C)-99.5 °

## 2017-12-18 NOTE — PROGRESS NOTES
[facility]  Progress Note    Everett Warren Patient Status:  Inpatient    1932 MRN [id number]   [facility] 3NW-A Attending Gray Parker, DO   Hosp Day # 2 PCP Gudelia Zhou MD     Subjective:  Patient lying in bed, feeling well.  Pass

## 2017-12-18 NOTE — PROGRESS NOTES
[facility]  Nephrology Progress Note    Madi Mena Patient Status:  Inpatient    1932 MRN [id number]   [facility] 3NW-A Attending Sravanthi Verdugo, DO   Hosp Day # 2 PCP Berna Kelley MD       SUBJECTIVE:  Pt seen on dialysis, no acu Medications:  lidocaine-prilocaine (EMLA) cream  Topical PRN   Albumin Human (ALBUMINAR) 25 % solution 100 mL 100 mL Intravenous PRN   ondansetron HCl (ZOFRAN) injection 4 mg 4 mg Intravenous Q4H PRN   0.9%  NaCl infusion  Intravenous Continuous   Heparin

## 2017-12-18 NOTE — PLAN OF CARE
GASTROINTESTINAL - ADULT    • Maintains or returns to baseline bowel function Completed        METABOLIC/FLUID AND ELECTROLYTES - ADULT    • Electrolytes maintained within normal limits Completed    • Hemodynamic stability and optimal renal function mainta

## 2017-12-26 ENCOUNTER — OFFICE VISIT (OUTPATIENT)
Dept: SURGERY | Facility: CLINIC | Age: 82
End: 2017-12-26

## 2017-12-26 VITALS
BODY MASS INDEX: 21.86 KG/M2 | DIASTOLIC BLOOD PRESSURE: 77 MMHG | WEIGHT: 136 LBS | HEART RATE: 74 BPM | HEIGHT: 66 IN | SYSTOLIC BLOOD PRESSURE: 129 MMHG

## 2017-12-26 DIAGNOSIS — K40.31 RECURRENT UNILATERAL INGUINAL HERNIA WITH OBSTRUCTION AND WITHOUT GANGRENE: Primary | ICD-10-CM

## 2017-12-26 PROBLEM — K40.90 INGUINAL HERNIA OF RIGHT SIDE WITHOUT OBSTRUCTION OR GANGRENE: Status: RESOLVED | Noted: 2017-01-16 | Resolved: 2017-12-26

## 2017-12-26 PROBLEM — N17.9 ACUTE KIDNEY INJURY (HCC): Status: RESOLVED | Noted: 2017-06-19 | Resolved: 2017-12-26

## 2017-12-26 PROCEDURE — 99024 POSTOP FOLLOW-UP VISIT: CPT | Performed by: PHYSICIAN ASSISTANT

## 2017-12-26 NOTE — PROGRESS NOTES
Post Operative Visit Note       Active Problems  1. Recurrent unilateral inguinal hernia with obstruction and without gangrene         Chief Complaint   Patient presents with:  Post-Op: post op - Right inguinal herniorrhaphy with mesh placement. 12-16 Packs/day: 0.00      Years: 0.00           Quit date: 3/19/1976    Smokeless tobacco: Never Used                           Current Outpatient Prescriptions:  HYDROcodone-acetaminophen (NORCO) 5-325 MG Oral Tab T behavioral problems and sleep disturbance. Physical Findings   /77   Pulse 74   Ht 66\"   Wt 136 lb   BMI 21.95 kg/m²   Physical Exam   Constitutional: He is oriented to person, place, and time. He appears well-developed and well-nourished.  No submerge his incisions at 2 weeks postop. He may clean with soap and water. His staples were removed at today's appointment. The swelling in his right groin region will continue to decrease as time goes on. All questions were answered.   The patient a

## 2018-04-28 ENCOUNTER — HOSPITAL ENCOUNTER (OUTPATIENT)
Dept: GENERAL RADIOLOGY | Age: 83
Discharge: HOME | End: 2018-04-28
Attending: INTERNAL MEDICINE
Payer: MEDICARE

## 2018-04-28 DIAGNOSIS — T69.9XXA: ICD-10-CM

## 2018-04-28 DIAGNOSIS — R52 PAIN: ICD-10-CM

## 2018-04-28 PROCEDURE — 73030 X-RAY EXAM OF SHOULDER: CPT | Performed by: INTERNAL MEDICINE

## 2018-12-14 ENCOUNTER — HOSPITAL ENCOUNTER (EMERGENCY)
Facility: HOSPITAL | Age: 83
Discharge: HOME | End: 2018-12-14
Attending: EMERGENCY MEDICINE
Payer: MEDICARE

## 2018-12-14 ENCOUNTER — APPOINTMENT (OUTPATIENT)
Dept: GENERAL RADIOLOGY | Facility: HOSPITAL | Age: 83
End: 2018-12-14
Attending: EMERGENCY MEDICINE
Payer: MEDICARE

## 2018-12-14 VITALS
HEIGHT: 68 IN | SYSTOLIC BLOOD PRESSURE: 145 MMHG | HEART RATE: 75 BPM | RESPIRATION RATE: 16 BRPM | OXYGEN SATURATION: 97 % | BODY MASS INDEX: 20.61 KG/M2 | TEMPERATURE: 98 F | WEIGHT: 136 LBS | DIASTOLIC BLOOD PRESSURE: 70 MMHG

## 2018-12-14 DIAGNOSIS — M54.50 CHRONIC BILATERAL LOW BACK PAIN WITHOUT SCIATICA: Primary | ICD-10-CM

## 2018-12-14 DIAGNOSIS — G89.29 CHRONIC BILATERAL LOW BACK PAIN WITHOUT SCIATICA: Primary | ICD-10-CM

## 2018-12-14 PROCEDURE — 72110 X-RAY EXAM L-2 SPINE 4/>VWS: CPT | Performed by: EMERGENCY MEDICINE

## 2018-12-14 PROCEDURE — 99284 EMERGENCY DEPT VISIT MOD MDM: CPT

## 2018-12-14 PROCEDURE — 96374 THER/PROPH/DIAG INJ IV PUSH: CPT

## 2018-12-14 RX ORDER — TRAMADOL HYDROCHLORIDE 50 MG/1
50 TABLET ORAL EVERY 4 HOURS PRN
Qty: 10 TABLET | Refills: 0 | Status: SHIPPED | OUTPATIENT
Start: 2018-12-14 | End: 2018-12-21

## 2018-12-14 RX ORDER — KETOROLAC TROMETHAMINE 30 MG/ML
30 INJECTION, SOLUTION INTRAMUSCULAR; INTRAVENOUS ONCE
Status: COMPLETED | OUTPATIENT
Start: 2018-12-14 | End: 2018-12-14

## 2018-12-14 RX ORDER — METHYLPREDNISOLONE 4 MG/1
TABLET ORAL
Qty: 1 PACKAGE | Refills: 0 | Status: SHIPPED | OUTPATIENT
Start: 2018-12-14 | End: 2018-12-19

## 2018-12-14 NOTE — ED PROVIDER NOTES
Patient Seen in: [facility] Emergency Department    History   Patient presents with:  Pain (neurologic)    Stated Complaint: Complaints of all over body pain. Is due for Hemodialysis today    HPI    51-year-old male presents with back pain.   He repo 12/14/18 1045 (!) 8   Temp 12/14/18 1050 97.7 °F (36.5 °C)   Temp src 12/14/18 1050 Temporal   SpO2 12/14/18 1045 95 %   O2 Device 12/14/18 1050 None (Room air)       Current:/69   Pulse 73   Temp 97.7 °F (36.5 °C) (Temporal)   Resp 16   Ht 172.7 cm presents with lower back pain. No neurologic symptoms. No chest or abdominal pain to suggest intra-abdominal pathology. He does not make urine. He is on hemodialysis. Patient feels better after Toradol here in the emergency room.   X-ray shows degene

## 2018-12-14 NOTE — CM/SW NOTE
Able to schedule chair time at [facility] for dialysis today after discharge.

## 2018-12-14 NOTE — ED INITIAL ASSESSMENT (HPI)
Pt aox3. Pt presents to ed with patients daughter. Pt c/o lower mid back pain started this am. Pts daughter denies fever. Pt lives with daughter. Pt to have dialysis today. Pt received dialysis 3 times a week. Pt denies abd pain, nvd.

## 2019-01-01 ENCOUNTER — HOSPITAL ENCOUNTER (OUTPATIENT)
Dept: CV DIAGNOSTICS | Facility: HOSPITAL | Age: 84
Discharge: HOME | End: 2019-01-01
Attending: INTERNAL MEDICINE
Payer: MEDICARE

## 2019-01-01 ENCOUNTER — EXTERNAL RECORD (OUTPATIENT)
Dept: HEALTH INFORMATION MANAGEMENT | Facility: OTHER | Age: 84
End: 2019-01-01

## 2019-01-01 ENCOUNTER — HOSPITAL ENCOUNTER (EMERGENCY)
Facility: HOSPITAL | Age: 84
Discharge: HOME | End: 2019-01-01
Attending: EMERGENCY MEDICINE
Payer: MEDICARE

## 2019-01-01 ENCOUNTER — APPOINTMENT (OUTPATIENT)
Dept: GENERAL RADIOLOGY | Facility: HOSPITAL | Age: 84
End: 2019-01-01
Attending: EMERGENCY MEDICINE
Payer: MEDICARE

## 2019-01-01 ENCOUNTER — HOSPITAL ENCOUNTER (INPATIENT)
Facility: HOSPITAL | Age: 84
LOS: 1 days | Discharge: HOME | DRG: 602 | End: 2019-01-01
Attending: EMERGENCY MEDICINE | Admitting: HOSPITALIST
Payer: MEDICARE

## 2019-01-01 VITALS
BODY MASS INDEX: 21.39 KG/M2 | SYSTOLIC BLOOD PRESSURE: 120 MMHG | RESPIRATION RATE: 20 BRPM | HEIGHT: 68 IN | OXYGEN SATURATION: 94 % | TEMPERATURE: 98 F | WEIGHT: 141.13 LBS | DIASTOLIC BLOOD PRESSURE: 86 MMHG | HEART RATE: 76 BPM

## 2019-01-01 VITALS
HEIGHT: 68 IN | RESPIRATION RATE: 20 BRPM | BODY MASS INDEX: 19.73 KG/M2 | TEMPERATURE: 98 F | WEIGHT: 130.19 LBS | HEART RATE: 70 BPM | SYSTOLIC BLOOD PRESSURE: 128 MMHG | DIASTOLIC BLOOD PRESSURE: 66 MMHG | OXYGEN SATURATION: 98 %

## 2019-01-01 DIAGNOSIS — I10 HTN (HYPERTENSION): ICD-10-CM

## 2019-01-01 DIAGNOSIS — N18.6 ESRD (END STAGE RENAL DISEASE) (HCC): ICD-10-CM

## 2019-01-01 DIAGNOSIS — I95.3 HYPOTENSION OF HEMODIALYSIS: ICD-10-CM

## 2019-01-01 DIAGNOSIS — N18.9 CHRONIC RENAL FAILURE, UNSPECIFIED CKD STAGE: ICD-10-CM

## 2019-01-01 DIAGNOSIS — I50.32 CHRONIC DIASTOLIC HEART FAILURE (HCC): ICD-10-CM

## 2019-01-01 DIAGNOSIS — Z99.2 ESRD (END STAGE RENAL DISEASE) ON DIALYSIS (HCC): Primary | ICD-10-CM

## 2019-01-01 DIAGNOSIS — R53.1 WEAKNESS GENERALIZED: Primary | ICD-10-CM

## 2019-01-01 DIAGNOSIS — N18.6 ESRD (END STAGE RENAL DISEASE) ON DIALYSIS (HCC): Primary | ICD-10-CM

## 2019-01-01 LAB
ATRIAL RATE: 81 BPM
P AXIS: 24 DEGREES
P-R INTERVAL: 162 MS
Q-T INTERVAL: 382 MS
QRS DURATION: 72 MS
QTC CALCULATION (BEZET): 443 MS
R AXIS: 14 DEGREES
T AXIS: 51 DEGREES
VENTRICULAR RATE: 81 BPM

## 2019-01-01 PROCEDURE — 93306 TTE W/DOPPLER COMPLETE: CPT | Performed by: INTERNAL MEDICINE

## 2019-01-01 PROCEDURE — 99239 HOSP IP/OBS DSCHRG MGMT >30: CPT | Performed by: HOSPITALIST

## 2019-01-01 PROCEDURE — 99285 EMERGENCY DEPT VISIT HI MDM: CPT | Performed by: EMERGENCY MEDICINE

## 2019-01-01 PROCEDURE — 93005 ELECTROCARDIOGRAM TRACING: CPT

## 2019-01-01 PROCEDURE — 85025 COMPLETE CBC W/AUTO DIFF WBC: CPT | Performed by: EMERGENCY MEDICINE

## 2019-01-01 PROCEDURE — 93010 ELECTROCARDIOGRAM REPORT: CPT | Performed by: EMERGENCY MEDICINE

## 2019-01-01 PROCEDURE — 93010 ELECTROCARDIOGRAM REPORT: CPT | Performed by: INTERNAL MEDICINE

## 2019-01-01 PROCEDURE — 99223 1ST HOSP IP/OBS HIGH 75: CPT | Performed by: HOSPITALIST

## 2019-01-01 PROCEDURE — 99222 1ST HOSP IP/OBS MODERATE 55: CPT | Performed by: INTERNAL MEDICINE

## 2019-01-01 PROCEDURE — 99232 SBSQ HOSP IP/OBS MODERATE 35: CPT | Performed by: INTERNAL MEDICINE

## 2019-01-01 PROCEDURE — 71045 X-RAY EXAM CHEST 1 VIEW: CPT | Performed by: EMERGENCY MEDICINE

## 2019-01-01 PROCEDURE — 96360 HYDRATION IV INFUSION INIT: CPT | Performed by: EMERGENCY MEDICINE

## 2019-01-01 PROCEDURE — 96361 HYDRATE IV INFUSION ADD-ON: CPT | Performed by: EMERGENCY MEDICINE

## 2019-01-01 PROCEDURE — 80053 COMPREHEN METABOLIC PANEL: CPT | Performed by: EMERGENCY MEDICINE

## 2019-01-01 PROCEDURE — 5A1D70Z PERFORMANCE OF URINARY FILTRATION, INTERMITTENT, LESS THAN 6 HOURS PER DAY: ICD-10-PCS | Performed by: INTERNAL MEDICINE

## 2019-01-01 RX ORDER — CALCIUM CARBONATE 200(500)MG
500 TABLET,CHEWABLE ORAL DAILY
Status: DISCONTINUED | OUTPATIENT
Start: 2019-01-01 | End: 2019-01-01

## 2019-01-01 RX ORDER — ALBUMIN (HUMAN) 12.5 G/50ML
100 SOLUTION INTRAVENOUS AS NEEDED
Status: DISCONTINUED | OUTPATIENT
Start: 2019-01-01 | End: 2019-01-01

## 2019-01-01 RX ORDER — HEPARIN SODIUM 5000 [USP'U]/ML
5000 INJECTION, SOLUTION INTRAVENOUS; SUBCUTANEOUS EVERY 12 HOURS SCHEDULED
Status: DISCONTINUED | OUTPATIENT
Start: 2019-01-01 | End: 2019-01-01

## 2019-01-01 RX ORDER — CEPHALEXIN 500 MG/1
500 CAPSULE ORAL DAILY
Qty: 5 CAPSULE | Refills: 0 | Status: SHIPPED | OUTPATIENT
Start: 2019-01-01 | End: 2019-01-01

## 2019-01-01 RX ORDER — LEVOTHYROXINE SODIUM 0.15 MG/1
150 TABLET ORAL
Status: DISCONTINUED | OUTPATIENT
Start: 2019-01-01 | End: 2019-01-01

## 2019-01-01 RX ORDER — CALCIUM CARBONATE 200(500)MG
1 TABLET,CHEWABLE ORAL DAILY
COMMUNITY

## 2019-01-01 RX ORDER — LEVOTHYROXINE SODIUM 0.15 MG/1
150 TABLET ORAL
Qty: 30 TABLET | Refills: 1 | Status: ON HOLD | OUTPATIENT
Start: 2019-01-01 | End: 2020-01-01

## 2019-01-01 RX ORDER — ASPIRIN 325 MG
325 TABLET ORAL ONCE
Status: COMPLETED | OUTPATIENT
Start: 2019-01-01 | End: 2019-01-01

## 2019-01-01 RX ORDER — POTASSIUM CHLORIDE 1.5 G/1.77G
20 POWDER, FOR SOLUTION ORAL ONCE
Status: COMPLETED | OUTPATIENT
Start: 2019-01-01 | End: 2019-01-01

## 2019-01-01 RX ORDER — MIDODRINE HYDROCHLORIDE 10 MG/1
10 TABLET ORAL 3 TIMES DAILY
COMMUNITY

## 2019-01-01 RX ORDER — SODIUM CHLORIDE 9 MG/ML
125 INJECTION, SOLUTION INTRAVENOUS CONTINUOUS
Status: DISCONTINUED | OUTPATIENT
Start: 2019-01-01 | End: 2019-01-01

## 2019-01-01 RX ORDER — ACETAMINOPHEN 325 MG/1
650 TABLET ORAL EVERY 6 HOURS PRN
Status: DISCONTINUED | OUTPATIENT
Start: 2019-01-01 | End: 2019-01-01

## 2019-01-01 RX ORDER — SODIUM CHLORIDE 9 MG/ML
INJECTION, SOLUTION INTRAVENOUS ONCE
Status: COMPLETED | OUTPATIENT
Start: 2019-01-01 | End: 2019-01-01

## 2019-01-01 RX ORDER — ASCORBIC ACID, THIAMINE, RIBOFLAVIN, NIACINAMIDE, PYRIDOXINE, FOLIC ACID, COBALAMIN, BIOTIN, PANTOTHENIC ACID 100; 1.5; 1.7; 20; 10; 1; 6; 300; 1 MG/1; MG/1; MG/1; MG/1; MG/1; MG/1; UG/1; UG/1; MG/1
1 TABLET, COATED ORAL DAILY
Status: DISCONTINUED | OUTPATIENT
Start: 2019-01-01 | End: 2019-01-01

## 2019-01-01 RX ORDER — ATORVASTATIN CALCIUM 40 MG/1
40 TABLET, FILM COATED ORAL NIGHTLY
Status: DISCONTINUED | OUTPATIENT
Start: 2019-01-01 | End: 2019-01-01

## 2019-01-01 RX ORDER — CHOLECALCIFEROL (VITAMIN D3) 1250 MCG
CAPSULE ORAL WEEKLY
COMMUNITY

## 2019-01-01 RX ORDER — ONDANSETRON 2 MG/ML
4 INJECTION INTRAMUSCULAR; INTRAVENOUS EVERY 6 HOURS PRN
Status: DISCONTINUED | OUTPATIENT
Start: 2019-01-01 | End: 2019-01-01

## 2019-03-01 VITALS
HEART RATE: 80 BPM | HEIGHT: 66 IN | SYSTOLIC BLOOD PRESSURE: 100 MMHG | WEIGHT: 132 LBS | BODY MASS INDEX: 21.21 KG/M2 | DIASTOLIC BLOOD PRESSURE: 54 MMHG

## 2019-04-06 NOTE — ED PROVIDER NOTES
Patient Seen in: [facility] Emergency Department    History   Patient presents with:  Hypotension (cardiovascular)    Stated Complaint: blood pressure problems    HPI    80-year-old male with history of end-stage renal disease on hemodialysis, hyper Alcohol use: Not on file    Drug use: Not on file      Review of Systems    Positive for stated complaint: blood pressure problems  Other systems are as noted in HPI. Constitutional and vital signs reviewed.       All other systems reviewed and negative ex A/G Ratio 0.8 (*)     All other components within normal limits   CBC W/ DIFFERENTIAL - Abnormal; Notable for the following components:    RBC 3.30 (*)     HGB 11.2 (*)     HCT 33.3 (*)     .9 (*)     RDW-SD 50.5 (*)     Lymphocyte Absolute 0.67 treating physician attending to the patient, I determined within reasonable clinical confidence and prior to discharge, that an emergency medical condition was not or was no longer present.   There was no indication for further evaluation, treatment, or adm

## 2019-04-06 NOTE — ED INITIAL ASSESSMENT (HPI)
Pt to ER c/o low blood pressure at PCP office (82/42) today. Per daughter, pt is less responsive and weak. Upon arrival to ED, pt is awake. Pt had dialysis yesterday.

## 2019-07-23 ENCOUNTER — TELEPHONE (OUTPATIENT)
Dept: CARDIOLOGY | Age: 84
End: 2019-07-23

## 2019-07-24 ENCOUNTER — APPOINTMENT (OUTPATIENT)
Dept: CARDIOLOGY | Age: 84
End: 2019-07-24

## 2019-07-30 ENCOUNTER — APPOINTMENT (OUTPATIENT)
Dept: CARDIOLOGY | Age: 84
End: 2019-07-30

## 2019-08-02 ENCOUNTER — APPOINTMENT (OUTPATIENT)
Dept: CARDIOLOGY | Age: 84
End: 2019-08-02

## 2019-08-02 RX ORDER — ERGOCALCIFEROL 1.25 MG/1
50000 CAPSULE ORAL
COMMUNITY
Start: 2017-02-03

## 2019-08-02 RX ORDER — METOPROLOL SUCCINATE 25 MG/1
12.5 TABLET, EXTENDED RELEASE ORAL DAILY
COMMUNITY
Start: 2017-02-03

## 2019-08-02 RX ORDER — ATORVASTATIN CALCIUM 40 MG/1
40 TABLET, FILM COATED ORAL
COMMUNITY
Start: 2017-02-03

## 2019-08-02 RX ORDER — LEVOTHYROXINE SODIUM 0.12 MG/1
TABLET ORAL
COMMUNITY
Start: 2017-02-03

## 2019-08-02 RX ORDER — VIT B COMP NO.3/FOLIC/C/BIOTIN 1 MG-60 MG
1 TABLET ORAL
COMMUNITY
Start: 2017-02-03

## 2019-11-14 PROBLEM — R53.1 WEAKNESS GENERALIZED: Status: ACTIVE | Noted: 2019-01-01

## 2019-11-15 PROBLEM — D64.9 ANEMIA: Status: ACTIVE | Noted: 2019-01-01

## 2019-11-15 PROBLEM — E87.6 HYPOKALEMIA: Status: ACTIVE | Noted: 2019-01-01

## 2019-11-15 PROBLEM — N18.9 CHRONIC RENAL FAILURE, UNSPECIFIED CKD STAGE: Status: ACTIVE | Noted: 2019-01-01

## 2019-11-15 PROBLEM — N17.9 ACUTE KIDNEY INJURY (HCC): Status: ACTIVE | Noted: 2019-01-01

## 2019-11-15 PROCEDURE — 99223 1ST HOSP IP/OBS HIGH 75: CPT | Performed by: INTERNAL MEDICINE

## 2019-11-15 NOTE — ED INITIAL ASSESSMENT (HPI)
Patient arrives with family for c/o generalized weakness for one day. Daughter states patient has been c/o nausea, no vomiting or diarrhea. Denies fevers. Patient is on dialysis, last dialysis session was yesterday.

## 2019-11-15 NOTE — CONSULTS
Cardiology Consult       NAME: Olaf Vansant - ROOM: B6/B6 - MRN: [id number] - Age: 80year old - :  1932    Date of Admission: 2019 10:10 PM  Admission Diagnosis: No admission diagnoses are documented for this encounter.     Chief Complaint:Abn tablet (0.8 mg total) by mouth daily. Atorvastatin Calcium 40 MG Oral Tab, Take 1 tablet (40 mg total) by mouth nightly.       Scheduled Medication:    Continuous Infusing Medication:    PRN Medication:       OBJECTIVE:  BP 94/56   Pulse 94   Temp 98.4 °F

## 2019-11-15 NOTE — SLP NOTE
SPEECH PATHOLOGY CONTACT NOTE:    Attempted to see pt for clinical bedside swallow assessment. Dialysis at bedside with pt in supine position. Dialysis RN verbalized preference for supine position due to fistula, if possible.   When asked via phone interp

## 2019-11-15 NOTE — CONSULTS
[facility]  Report of Consultation    Lea Roberts Patient Status:  Inpatient    1932 MRN [id number]   [facility] 7NE-A Attending Mara Blanco MD   Hosp Day # 0 PCP Asha Cesar MD     Reason for Consultation:  ESRD/weakness    H Intravenous, Q6H PRN  •  Levothyroxine Sodium (SYNTHROID) tab 150 mcg, 150 mcg, Oral, Before breakfast  •  ceFAZolin (ANCEF) IVPB 1g/100ml in 0.9% NaCl minibag/add-van, 1 g, Intravenous, Q24H  No current outpatient medications on file.       Review of Syste 04/06/2019 34 (H)     Creatinine (mg/dL)   Date Value   11/15/2019 7.76 (H)   11/14/2019 7.20 (H)   04/06/2019 5.06 (H)       Malb/Cre Calc   Date Value Ref Range Status   07/28/2016 4,224.3 (H) <=30.0 ug/mg Final       Recent Labs   Lab 11/14/19  5784

## 2019-11-15 NOTE — PROGRESS NOTES
Pt seen and examined. Cont IV ancef for cellulitis. PCT elevated though likely due to ESRD. Pt does not appear septic. Serial trop neg so far.     Sally Bowen MD

## 2019-11-15 NOTE — DIETARY NOTE
[facility]    NUTRITION INITIAL ASSESSMENT    Pt does not meet malnutrition criteria.     CRITERIA FOR MALNUTRITION    Inadequate energy intake related to inability to take sufficient PO as evidenced by decreased appetite and refusing to eat on 11/15 LABS:  K 3.0 11/15  BUN/Cr 83/7.76 11/15  Calcium 7.4 11/15    Pt is at moderate nutrition risk    FOLLOW-UP DATE:   11/18    [facility]  Dietetic Intern

## 2019-11-15 NOTE — PROGRESS NOTES
[facility] Cardiology Progress Note    Subjective:  Currently being dialyzed. Shakes head no when asked about any pain. Nurse relates he had some pain at fistula site.      Objective:  /62 (BP Location: Left arm)   Pulse 77   Temp 97.6 °F (36.4 °C) (Oral

## 2019-11-15 NOTE — CONSULTS
[facility]  Report of Inpatient Wound Care Consultation    Burton Hernandez Patient Status:  Inpatient    1932 MRN [id number]   [facility] 7NE-A Attending Jaguar Delacruz MD   Hosp Day # 0 PCP Haley Child MD     Reason for Consultation Impression:  Right leg wound  Etiology unknown at this time. Wound appears like a\" skin growth\". Wound measures 2.7cm x 3 cm with  0.3 to 0.4 cm height of the hypergranulating tissue.   Wound bed covered with a very friable pale pink/grey hyper gr

## 2019-11-15 NOTE — PLAN OF CARE
Assumed care @6498  VSS, systolic blood pressures have been in the low 100's. He is currently receiving dialysis. A&Ox3, disoriented to situation/language barrier. RA  NSR , Pain at A/V fistula site. Nephrology notified and will look at it tomorrow.   B signs and symptoms of electrolyte imbalances  - Administer electrolyte replacement as ordered  - Monitor response to electrolyte replacements, including rhythm and repeat lab results as appropriate  - Fluid restriction as ordered  - Instruct patient on flu blood products/factors, fluids and medications as ordered and appropriate  - Administer supportive blood products/factors as ordered and appropriate  11/15/2019 1643 by Alicia Alfredo RN  Outcome: Progressing  11/15/2019 1642 by Alicia Alfredo RN  Outcom

## 2019-11-15 NOTE — H&P
[facility]  History and Physical     Ezio Morton Patient Status:  Inpatient    1932 MRN [id number]   [facility] 7NE-A Attending Pam Cronin MD   Hosp Day # 0 PCP Nikki Norwood MD     Chief Complaint: generalized weakness Cholecalciferol (VITAMIN D3) 11588 units Oral Cap, Take by mouth once a week., Disp: , Rfl:   Levothyroxine Sodium 137 MCG Oral Tab, Take 137 mcg by mouth before breakfast., Disp: , Rfl:   metoprolol Tartrate 25 MG Oral Tab, Take 0.5 tablets (12.5 mg tot ALB 3.0*      K 3.2*   CL 98   CO2 31.0   ALKPHO 60   AST 15   ALT 17   BILT 0.5   TP 7.2       Estimated Creatinine Clearance: 6.2 mL/min (A) (based on SCr of 7.2 mg/dL (H)). No results for input(s): PTP, INR in the last 168 hours.     Recent La

## 2019-11-15 NOTE — ED NOTES
Report called to donna ceron rn bed ready pt awaiting [facility] eta 50 mins entered on the wrong pt

## 2019-11-15 NOTE — ED PROVIDER NOTES
Patient Seen in: [facility] Emergency Department      History   Patient presents with:  Fatigue (constitutional, neurologic)    Stated Complaint: generalized weakness     HPI    Patient is an 26-year-old male comes to emergency room for evaluation o as noted above.     Physical Exam     ED Triage Vitals [11/14/19 2917]   /58   Pulse 96   Resp 21   Temp 98.4 °F (36.9 °C)   Temp src Oral   SpO2 96 %   O2 Device None (Room air)       Current:BP 98/52   Pulse 85   Temp 98.4 °F (36.9 °C) (Oral)   Resp components within normal limits   CBC W/ DIFFERENTIAL - Abnormal; Notable for the following components:    RBC 3.50 (*)     HGB 11.3 (*)     HCT 34.2 (*)     RDW-SD 49.8 (*)     Neutrophil Absolute Prelim 9.01 (*)     Neutrophil Absolute 9.01 (*)     Lymph Impression:  Weakness generalized  (primary encounter diagnosis)  Chronic renal failure, unspecified CKD stage  Hypokalemia    Disposition:  Admit  11/14/2019 10:33 pm    Follow-up:  No follow-up provider specified.       Medications Prescribed:  Current Ke Drone

## 2019-11-15 NOTE — PROGRESS NOTES
Pharmacy Note: Renal dose adjustment of Ancef    Dayne Che is a 80year old male who has been prescribed Ancef 1gmevery 8 hours. CrCl is 6.2 ml/min so the dose has been adjusted  to 1gm IV every 24 hours per hospital renal dose adjustment protocol.   Pha

## 2019-11-16 NOTE — SLP NOTE
ADULT SWALLOWING EVALUATION    ASSESSMENT    ASSESSMENT/OVERALL IMPRESSION:  B/S swallow eval completed upon receipt of MD order. Pt admitted to [facility] 11/14/19 with weakness. SLP attempted 11/15, however pt occupied with dialysis.   Pt alert and partici liquids(renal)  Dysphagia History: No reported history of dysphagia  Imaging Results: No current CXR on file    SUBJECTIVE   Pt alert and participatory.   Suspicious of care provided however reassured via  explaining medical treatment    OBJECTIVE

## 2019-11-16 NOTE — PHYSICAL THERAPY NOTE
PHYSICAL THERAPY QUICK EVALUATION - INPATIENT    Room Number: 0104/1511-X  Evaluation Date: 11/16/2019  Presenting Problem: generalized weakness  Physician Order: PT Eval and Treat    Problem List  Principal Problem:    Weakness generalized  Active Probl ACTIVITY TOLERANCE                         O2 WALK                  AM-PAC '6-Clicks' INPATIENT SHORT FORM - BASIC MOBILITY  How much difficulty does the patient currently have. ..  -   Turning over in bed (including adjusting bedclothes, sheets and gerson End of Session: Up in chair;Needs met;Call light within reach;RN aware of session/findings; All patient questions and concerns addressed; Alarm set    ASSESSMENT   Patient is a 80year old male admitted on 11/14/2019 for generalized weakness.   Pertinent marzena

## 2019-11-16 NOTE — CM/SW NOTE
CM received page regarding D/C and PT recommendations. Orders for HHC/PT noted. Paged [facility] with the new order. Pt will D/C this evening.

## 2019-11-16 NOTE — HOME CARE LIAISON
Attempted to meet with patient to offer home health. No family at bedside. Patient requested that I speak with daughter. Called and left voice message for daughter, Augusto.  Will go ahead and set patient up with [facility] and await call back from [facility]

## 2019-11-16 NOTE — PROGRESS NOTES
[facility]  Nephrology Progress Note    Alverto Fowler Patient Status:  Inpatient    1932 MRN [id number]   [facility] 7NE-A Attending Gal Segura MD   Hosp Day # 1 PCP Foster Angelo MD       SUBJECTIVE:  Stable this AM        Physi tab 500 mg, 500 mg, Oral, Daily  multivitamin (DIALYVITE - RENAL) tab 1 tablet, 1 tablet, Oral, Daily  metoprolol tartrate (LOPRESSOR) partial tablet 12.5 mg, 12.5 mg, Oral, Once per day on Sun Tue Thu Sat  Heparin Sodium (Porcine) 5000 UNIT/ML injection 5

## 2019-11-16 NOTE — PLAN OF CARE
No issues overnight. Patient resting comfortably. Denies pain (per ). Only has pain when you touch leg wound. IV abx continued.

## 2019-11-17 NOTE — PLAN OF CARE
NURSING DISCHARGE NOTE    Discharged Home via Wheelchair. Accompanied by daughter and support staff. Belongings Taken by patient/family. Discharge instructions, medications, prescriptions, and follow up appointments discussed with daughter.  All q

## 2019-11-18 RX ORDER — CALCIUM CARBONATE 500 MG/1
1 TABLET, CHEWABLE ORAL
COMMUNITY

## 2019-11-18 RX ORDER — CHOLECALCIFEROL (VITAMIN D3) 1250 MCG
CAPSULE ORAL
COMMUNITY

## 2019-11-19 ENCOUNTER — OFFICE VISIT (OUTPATIENT)
Dept: CARDIOLOGY | Age: 84
End: 2019-11-19

## 2019-11-19 VITALS
WEIGHT: 140 LBS | BODY MASS INDEX: 21.22 KG/M2 | HEIGHT: 68 IN | HEART RATE: 84 BPM | DIASTOLIC BLOOD PRESSURE: 58 MMHG | SYSTOLIC BLOOD PRESSURE: 100 MMHG

## 2019-11-19 DIAGNOSIS — I50.1 CHRONIC LEFT-SIDED CHF (CONGESTIVE HEART FAILURE) (CMD): Primary | ICD-10-CM

## 2019-11-19 DIAGNOSIS — I10 ESSENTIAL HYPERTENSION: ICD-10-CM

## 2019-11-19 DIAGNOSIS — E78.2 HYPERLIPIDEMIA, MIXED: ICD-10-CM

## 2019-11-19 DIAGNOSIS — I35.0 AORTIC VALVE STENOSIS, ETIOLOGY OF CARDIAC VALVE DISEASE UNSPECIFIED: ICD-10-CM

## 2019-11-19 DIAGNOSIS — I71.20 THORACIC AORTIC ANEURYSM WITHOUT RUPTURE (CMD): ICD-10-CM

## 2019-11-19 PROBLEM — N18.6 ESRD (END STAGE RENAL DISEASE) ON DIALYSIS (CMD): Status: ACTIVE | Noted: 2017-02-03

## 2019-11-19 PROBLEM — Z99.2 ESRD (END STAGE RENAL DISEASE) ON DIALYSIS (CMD): Status: ACTIVE | Noted: 2017-02-03

## 2019-11-19 PROBLEM — I27.20 PULMONARY HYPERTENSION (CMD): Status: ACTIVE | Noted: 2019-11-19

## 2019-11-19 PROBLEM — E78.5 DYSLIPIDEMIA: Status: RESOLVED | Noted: 2019-11-19 | Resolved: 2019-11-19

## 2019-11-19 PROBLEM — E78.5 DYSLIPIDEMIA: Status: ACTIVE | Noted: 2019-11-19

## 2019-11-19 PROCEDURE — 99205 OFFICE O/P NEW HI 60 MIN: CPT | Performed by: INTERNAL MEDICINE

## 2019-11-19 RX ORDER — LEVOTHYROXINE SODIUM 0.15 MG/1
TABLET ORAL
COMMUNITY
Start: 2019-11-17

## 2019-11-19 SDOH — HEALTH STABILITY: MENTAL HEALTH: HOW OFTEN DO YOU HAVE A DRINK CONTAINING ALCOHOL?: NEVER

## 2019-11-19 ASSESSMENT — PATIENT HEALTH QUESTIONNAIRE - PHQ9
1. LITTLE INTEREST OR PLEASURE IN DOING THINGS: NOT AT ALL
SUM OF ALL RESPONSES TO PHQ9 QUESTIONS 1 AND 2: 0
2. FEELING DOWN, DEPRESSED OR HOPELESS: NOT AT ALL
SUM OF ALL RESPONSES TO PHQ9 QUESTIONS 1 AND 2: 0

## 2019-11-19 NOTE — DISCHARGE SUMMARY
[facility]  DISCHARGE SUMMARY     Sienna Blanco Patient Status:  Inpatient    1932 MRN [id number]   [facility] 7NE-A Attending No att. providers found   Hosp Day # 1 PCP Andrés Hernandez MD     Date of Admission: 2019  Date of D Discharge Medications      START taking these medications      Instructions Prescription details   cephALEXin 500 MG Caps  Commonly known as:  KEFLEX      Take 1 capsule (500 mg total) by mouth daily for 5 days.    Stop taking on:  November 22, 2019  Selin Bradford the location directed by your doctor or nurse    Bring a paper prescription for each of these medications  · cephALEXin 500 MG Caps         [place]    Follow-up appointment:   Roxanne Dominguez, [address] [place] [address]

## 2019-11-21 ENCOUNTER — ADVANCED DIRECTIVES (OUTPATIENT)
Dept: CARDIOLOGY | Age: 84
End: 2019-11-21

## 2020-01-01 ENCOUNTER — HOSPITAL ENCOUNTER (INPATIENT)
Dept: RADIATION ONCOLOGY | Facility: HOSPITAL | Age: 85
Discharge: HOME | DRG: 252 | End: 2020-01-01
Attending: INTERNAL MEDICINE
Payer: MEDICARE

## 2020-01-01 ENCOUNTER — APPOINTMENT (OUTPATIENT)
Dept: INTERVENTIONAL RADIOLOGY/VASCULAR | Facility: HOSPITAL | Age: 85
DRG: 252 | End: 2020-01-01
Attending: INTERNAL MEDICINE
Payer: MEDICARE

## 2020-01-01 ENCOUNTER — ANESTHESIA (OUTPATIENT)
Dept: CARDIAC SURGERY | Facility: HOSPITAL | Age: 85
DRG: 252 | End: 2020-01-01
Payer: MEDICARE

## 2020-01-01 ENCOUNTER — HOSPITAL ENCOUNTER (INPATIENT)
Facility: HOSPITAL | Age: 85
LOS: 7 days | Discharge: HOME HEALTH | DRG: 252 | End: 2020-01-01
Attending: EMERGENCY MEDICINE | Admitting: INTERNAL MEDICINE
Payer: MEDICARE

## 2020-01-01 ENCOUNTER — APPOINTMENT (OUTPATIENT)
Dept: GENERAL RADIOLOGY | Facility: HOSPITAL | Age: 85
DRG: 252 | End: 2020-01-01
Attending: EMERGENCY MEDICINE
Payer: MEDICARE

## 2020-01-01 ENCOUNTER — ANESTHESIA EVENT (OUTPATIENT)
Dept: CARDIAC SURGERY | Facility: HOSPITAL | Age: 85
DRG: 252 | End: 2020-01-01
Payer: MEDICARE

## 2020-01-01 ENCOUNTER — APPOINTMENT (OUTPATIENT)
Dept: MRI IMAGING | Facility: HOSPITAL | Age: 85
DRG: 252 | End: 2020-01-01
Attending: INTERNAL MEDICINE
Payer: MEDICARE

## 2020-01-01 ENCOUNTER — APPOINTMENT (OUTPATIENT)
Dept: CT IMAGING | Facility: HOSPITAL | Age: 85
DRG: 252 | End: 2020-01-01
Attending: EMERGENCY MEDICINE
Payer: MEDICARE

## 2020-01-01 VITALS
OXYGEN SATURATION: 94 % | SYSTOLIC BLOOD PRESSURE: 118 MMHG | HEIGHT: 66 IN | RESPIRATION RATE: 18 BRPM | HEART RATE: 79 BPM | BODY MASS INDEX: 21.8 KG/M2 | WEIGHT: 135.63 LBS | TEMPERATURE: 98 F | DIASTOLIC BLOOD PRESSURE: 71 MMHG

## 2020-01-01 DIAGNOSIS — N18.6 ESRD (END STAGE RENAL DISEASE) (HCC): ICD-10-CM

## 2020-01-01 DIAGNOSIS — R41.0 CONFUSED: Primary | ICD-10-CM

## 2020-01-01 DIAGNOSIS — Z71.89 ADVANCED CARE PLANNING/COUNSELING DISCUSSION: ICD-10-CM

## 2020-01-01 DIAGNOSIS — R53.1 WEAKNESS GENERALIZED: ICD-10-CM

## 2020-01-01 DIAGNOSIS — G93.89 BRAIN MASS: ICD-10-CM

## 2020-01-01 DIAGNOSIS — Z51.5 PALLIATIVE CARE ENCOUNTER: ICD-10-CM

## 2020-01-01 DIAGNOSIS — Z71.89 GOALS OF CARE, COUNSELING/DISCUSSION: ICD-10-CM

## 2020-01-01 LAB
ALBUMIN SERPL-MCNC: 3.2 G/DL (ref 3.4–5)
ALBUMIN/GLOB SERPL: 0.8 {RATIO} (ref 1–2)
ALP LIVER SERPL-CCNC: 71 U/L (ref 45–117)
ALT SERPL-CCNC: 11 U/L (ref 16–61)
ANION GAP SERPL CALC-SCNC: 11 MMOL/L (ref 0–18)
ANION GAP SERPL CALC-SCNC: 11 MMOL/L (ref 0–18)
ANION GAP SERPL CALC-SCNC: 9 MMOL/L (ref 0–18)
ANTIBODY SCREEN: NEGATIVE
AST SERPL-CCNC: 15 U/L (ref 15–37)
ATRIAL RATE: 76 BPM
BASOPHILS # BLD AUTO: 0.01 X10(3) UL (ref 0–0.2)
BASOPHILS # BLD AUTO: 0.02 X10(3) UL (ref 0–0.2)
BASOPHILS # BLD AUTO: 0.02 X10(3) UL (ref 0–0.2)
BASOPHILS # BLD AUTO: 0.04 X10(3) UL (ref 0–0.2)
BASOPHILS NFR BLD AUTO: 0.1 %
BASOPHILS NFR BLD AUTO: 0.1 %
BASOPHILS NFR BLD AUTO: 0.2 %
BASOPHILS NFR BLD AUTO: 0.3 %
BASOPHILS NFR BLD AUTO: 0.4 %
BILIRUB SERPL-MCNC: 0.4 MG/DL (ref 0.1–2)
BLOOD TYPE BARCODE: 5100
BUN BLD-MCNC: 24 MG/DL (ref 7–18)
BUN BLD-MCNC: 42 MG/DL (ref 7–18)
BUN BLD-MCNC: 70 MG/DL (ref 7–18)
BUN/CREAT SERPL: 4.1 (ref 10–20)
BUN/CREAT SERPL: 5 (ref 10–20)
BUN/CREAT SERPL: 5.6 (ref 10–20)
C DIFF TOX B STL QL: NEGATIVE
CALCIUM BLD-MCNC: 7.3 MG/DL (ref 8.5–10.1)
CALCIUM BLD-MCNC: 7.8 MG/DL (ref 8.5–10.1)
CALCIUM BLD-MCNC: 8.3 MG/DL (ref 8.5–10.1)
CANCER AG19-9 SERPL-ACNC: 29.4 U/ML (ref ?–37)
CEA SERPL-MCNC: 9.4 NG/ML (ref ?–5)
CHLORIDE SERPL-SCNC: 103 MMOL/L (ref 98–112)
CHLORIDE SERPL-SCNC: 105 MMOL/L (ref 98–112)
CHLORIDE SERPL-SCNC: 111 MMOL/L (ref 98–112)
CO2 SERPL-SCNC: 22 MMOL/L (ref 21–32)
CO2 SERPL-SCNC: 26 MMOL/L (ref 21–32)
CO2 SERPL-SCNC: 27 MMOL/L (ref 21–32)
COMPLEXED PSA SERPL-MCNC: 0.06 NG/ML (ref ?–4)
CREAT BLD-MCNC: 14 MG/DL (ref 0.7–1.3)
CREAT BLD-MCNC: 5.89 MG/DL (ref 0.7–1.3)
CREAT BLD-MCNC: 7.53 MG/DL (ref 0.7–1.3)
DEPRECATED RDW RBC AUTO: 49.3 FL (ref 35.1–46.3)
DEPRECATED RDW RBC AUTO: 50.3 FL (ref 35.1–46.3)
DEPRECATED RDW RBC AUTO: 51.6 FL (ref 35.1–46.3)
DEPRECATED RDW RBC AUTO: 54.4 FL (ref 35.1–46.3)
DEPRECATED RDW RBC AUTO: 56.3 FL (ref 35.1–46.3)
DEPRECATED RDW RBC AUTO: 58.4 FL (ref 35.1–46.3)
DEPRECATED RDW RBC AUTO: 61.7 FL (ref 35.1–46.3)
EOSINOPHIL # BLD AUTO: 0 X10(3) UL (ref 0–0.7)
EOSINOPHIL # BLD AUTO: 0.01 X10(3) UL (ref 0–0.7)
EOSINOPHIL # BLD AUTO: 0.03 X10(3) UL (ref 0–0.7)
EOSINOPHIL # BLD AUTO: 0.36 X10(3) UL (ref 0–0.7)
EOSINOPHIL NFR BLD AUTO: 0 %
EOSINOPHIL NFR BLD AUTO: 0.1 %
EOSINOPHIL NFR BLD AUTO: 0.3 %
EOSINOPHIL NFR BLD AUTO: 5.3 %
ERYTHROCYTE [DISTWIDTH] IN BLOOD BY AUTOMATED COUNT: 14 % (ref 11–15)
ERYTHROCYTE [DISTWIDTH] IN BLOOD BY AUTOMATED COUNT: 14.2 % (ref 11–15)
ERYTHROCYTE [DISTWIDTH] IN BLOOD BY AUTOMATED COUNT: 14.3 % (ref 11–15)
ERYTHROCYTE [DISTWIDTH] IN BLOOD BY AUTOMATED COUNT: 16.6 % (ref 11–15)
ERYTHROCYTE [DISTWIDTH] IN BLOOD BY AUTOMATED COUNT: 16.9 % (ref 11–15)
ERYTHROCYTE [DISTWIDTH] IN BLOOD BY AUTOMATED COUNT: 17.4 % (ref 11–15)
ERYTHROCYTE [DISTWIDTH] IN BLOOD BY AUTOMATED COUNT: 17.8 % (ref 11–15)
GLOBULIN PLAS-MCNC: 4.2 G/DL (ref 2.8–4.4)
GLUCOSE BLD-MCNC: 103 MG/DL (ref 70–99)
GLUCOSE BLD-MCNC: 131 MG/DL (ref 70–99)
GLUCOSE BLD-MCNC: 132 MG/DL (ref 70–99)
GLUCOSE BLD-MCNC: 144 MG/DL (ref 70–99)
GLUCOSE BLD-MCNC: 165 MG/DL (ref 70–99)
HAV IGM SER QL: 2 MG/DL (ref 1.6–2.6)
HAV IGM SER QL: 2 MG/DL (ref 1.6–2.6)
HBV SURFACE AG SER-ACNC: <0.1 [IU]/L
HBV SURFACE AG SERPL QL IA: NONREACTIVE
HCT VFR BLD AUTO: 20.7 % (ref 39–53)
HCT VFR BLD AUTO: 25.1 % (ref 39–53)
HCT VFR BLD AUTO: 25.5 % (ref 39–53)
HCT VFR BLD AUTO: 26.5 % (ref 39–53)
HCT VFR BLD AUTO: 26.6 % (ref 39–53)
HCT VFR BLD AUTO: 27.6 % (ref 39–53)
HCT VFR BLD AUTO: 28.4 % (ref 39–53)
HGB BLD-MCNC: 6.8 G/DL (ref 13–17.5)
HGB BLD-MCNC: 8.2 G/DL (ref 13–17.5)
HGB BLD-MCNC: 8.3 G/DL (ref 13–17.5)
HGB BLD-MCNC: 8.6 G/DL (ref 13–17.5)
HGB BLD-MCNC: 8.6 G/DL (ref 13–17.5)
HGB BLD-MCNC: 8.8 G/DL (ref 13–17.5)
HGB BLD-MCNC: 8.8 G/DL (ref 13–17.5)
IMM GRANULOCYTES # BLD AUTO: 0.03 X10(3) UL (ref 0–1)
IMM GRANULOCYTES # BLD AUTO: 0.04 X10(3) UL (ref 0–1)
IMM GRANULOCYTES # BLD AUTO: 0.13 X10(3) UL (ref 0–1)
IMM GRANULOCYTES # BLD AUTO: 0.19 X10(3) UL (ref 0–1)
IMM GRANULOCYTES # BLD AUTO: 0.42 X10(3) UL (ref 0–1)
IMM GRANULOCYTES NFR BLD: 0.4 %
IMM GRANULOCYTES NFR BLD: 0.5 %
IMM GRANULOCYTES NFR BLD: 0.8 %
IMM GRANULOCYTES NFR BLD: 0.8 %
IMM GRANULOCYTES NFR BLD: 1.3 %
IMM GRANULOCYTES NFR BLD: 1.6 %
IMM GRANULOCYTES NFR BLD: 4 %
INR BLD: 1.05 (ref 0.9–1.1)
LDH SERPL L TO P-CCNC: 197 U/L (ref 87–241)
LYMPHOCYTES # BLD AUTO: 0.39 X10(3) UL (ref 1–4)
LYMPHOCYTES # BLD AUTO: 0.47 X10(3) UL (ref 1–4)
LYMPHOCYTES # BLD AUTO: 0.5 X10(3) UL (ref 1–4)
LYMPHOCYTES # BLD AUTO: 0.55 X10(3) UL (ref 1–4)
LYMPHOCYTES # BLD AUTO: 0.56 X10(3) UL (ref 1–4)
LYMPHOCYTES # BLD AUTO: 0.84 X10(3) UL (ref 1–4)
LYMPHOCYTES # BLD AUTO: 0.84 X10(3) UL (ref 1–4)
LYMPHOCYTES NFR BLD AUTO: 10.3 %
LYMPHOCYTES NFR BLD AUTO: 11.1 %
LYMPHOCYTES NFR BLD AUTO: 12.3 %
LYMPHOCYTES NFR BLD AUTO: 4.5 %
LYMPHOCYTES NFR BLD AUTO: 4.8 %
LYMPHOCYTES NFR BLD AUTO: 5.1 %
LYMPHOCYTES NFR BLD AUTO: 7.9 %
M PROTEIN MFR SERPL ELPH: 7.4 G/DL (ref 6.4–8.2)
MCH RBC QN AUTO: 28.9 PG (ref 26–34)
MCH RBC QN AUTO: 29.2 PG (ref 26–34)
MCH RBC QN AUTO: 29.2 PG (ref 26–34)
MCH RBC QN AUTO: 29.5 PG (ref 26–34)
MCH RBC QN AUTO: 31.2 PG (ref 26–34)
MCH RBC QN AUTO: 31.5 PG (ref 26–34)
MCH RBC QN AUTO: 31.9 PG (ref 26–34)
MCHC RBC AUTO-ENTMCNC: 31 G/DL (ref 31–37)
MCHC RBC AUTO-ENTMCNC: 31.9 G/DL (ref 31–37)
MCHC RBC AUTO-ENTMCNC: 32.3 G/DL (ref 31–37)
MCHC RBC AUTO-ENTMCNC: 32.5 G/DL (ref 31–37)
MCHC RBC AUTO-ENTMCNC: 32.5 G/DL (ref 31–37)
MCHC RBC AUTO-ENTMCNC: 32.7 G/DL (ref 31–37)
MCHC RBC AUTO-ENTMCNC: 32.9 G/DL (ref 31–37)
MCV RBC AUTO: 89.8 FL (ref 80–100)
MCV RBC AUTO: 90.5 FL (ref 80–100)
MCV RBC AUTO: 90.7 FL (ref 80–100)
MCV RBC AUTO: 94.4 FL (ref 80–100)
MCV RBC AUTO: 95.4 FL (ref 80–100)
MCV RBC AUTO: 97.2 FL (ref 80–100)
MCV RBC AUTO: 97.4 FL (ref 80–100)
MONOCYTES # BLD AUTO: 0.14 X10(3) UL (ref 0.1–1)
MONOCYTES # BLD AUTO: 0.26 X10(3) UL (ref 0.1–1)
MONOCYTES # BLD AUTO: 0.39 X10(3) UL (ref 0.1–1)
MONOCYTES # BLD AUTO: 0.61 X10(3) UL (ref 0.1–1)
MONOCYTES # BLD AUTO: 0.63 X10(3) UL (ref 0.1–1)
MONOCYTES # BLD AUTO: 0.69 X10(3) UL (ref 0.1–1)
MONOCYTES # BLD AUTO: 0.95 X10(3) UL (ref 0.1–1)
MONOCYTES NFR BLD AUTO: 10.1 %
MONOCYTES NFR BLD AUTO: 2.9 %
MONOCYTES NFR BLD AUTO: 5.1 %
MONOCYTES NFR BLD AUTO: 5.3 %
MONOCYTES NFR BLD AUTO: 5.3 %
MONOCYTES NFR BLD AUTO: 6.1 %
MONOCYTES NFR BLD AUTO: 9 %
NEUTROPHILS # BLD AUTO: 10.22 X10 (3) UL (ref 1.5–7.7)
NEUTROPHILS # BLD AUTO: 10.22 X10(3) UL (ref 1.5–7.7)
NEUTROPHILS # BLD AUTO: 4.09 X10 (3) UL (ref 1.5–7.7)
NEUTROPHILS # BLD AUTO: 4.09 X10(3) UL (ref 1.5–7.7)
NEUTROPHILS # BLD AUTO: 4.18 X10 (3) UL (ref 1.5–7.7)
NEUTROPHILS # BLD AUTO: 4.18 X10(3) UL (ref 1.5–7.7)
NEUTROPHILS # BLD AUTO: 4.87 X10 (3) UL (ref 1.5–7.7)
NEUTROPHILS # BLD AUTO: 4.87 X10(3) UL (ref 1.5–7.7)
NEUTROPHILS # BLD AUTO: 6.85 X10 (3) UL (ref 1.5–7.7)
NEUTROPHILS # BLD AUTO: 6.85 X10(3) UL (ref 1.5–7.7)
NEUTROPHILS # BLD AUTO: 8.33 X10 (3) UL (ref 1.5–7.7)
NEUTROPHILS # BLD AUTO: 8.33 X10(3) UL (ref 1.5–7.7)
NEUTROPHILS # BLD AUTO: 9.15 X10 (3) UL (ref 1.5–7.7)
NEUTROPHILS # BLD AUTO: 9.15 X10(3) UL (ref 1.5–7.7)
NEUTROPHILS NFR BLD AUTO: 71.6 %
NEUTROPHILS NFR BLD AUTO: 78.4 %
NEUTROPHILS NFR BLD AUTO: 82.6 %
NEUTROPHILS NFR BLD AUTO: 85.8 %
NEUTROPHILS NFR BLD AUTO: 88 %
NEUTROPHILS NFR BLD AUTO: 88 %
NEUTROPHILS NFR BLD AUTO: 89.2 %
OSMOLALITY SERPL CALC.SUM OF ELEC: 294 MOSM/KG (ref 275–295)
OSMOLALITY SERPL CALC.SUM OF ELEC: 298 MOSM/KG (ref 275–295)
OSMOLALITY SERPL CALC.SUM OF ELEC: 327 MOSM/KG (ref 275–295)
P AXIS: 38 DEGREES
P-R INTERVAL: 168 MS
PLATELET # BLD AUTO: 111 10(3)UL (ref 150–450)
PLATELET # BLD AUTO: 121 10(3)UL (ref 150–450)
PLATELET # BLD AUTO: 132 10(3)UL (ref 150–450)
PLATELET # BLD AUTO: 133 10(3)UL (ref 150–450)
PLATELET # BLD AUTO: 137 10(3)UL (ref 150–450)
PLATELET # BLD AUTO: 149 10(3)UL (ref 150–450)
PLATELET # BLD AUTO: 155 10(3)UL (ref 150–450)
PLATELET # BLD AUTO: 175 10(3)UL (ref 150–450)
POTASSIUM SERPL-SCNC: 3.4 MMOL/L (ref 3.5–5.1)
POTASSIUM SERPL-SCNC: 3.7 MMOL/L (ref 3.5–5.1)
POTASSIUM SERPL-SCNC: 3.8 MMOL/L (ref 3.5–5.1)
PROCALCITONIN SERPL-MCNC: 0.97 NG/ML
PSA SERPL DL<=0.01 NG/ML-MCNC: 14.2 SECONDS (ref 12.5–14.7)
Q-T INTERVAL: 414 MS
QRS DURATION: 84 MS
QTC CALCULATION (BEZET): 465 MS
R AXIS: 11 DEGREES
RBC # BLD AUTO: 2.13 X10(6)UL (ref 3.8–5.8)
RBC # BLD AUTO: 2.63 X10(6)UL (ref 3.8–5.8)
RBC # BLD AUTO: 2.73 X10(6)UL (ref 3.8–5.8)
RBC # BLD AUTO: 2.81 X10(6)UL (ref 3.8–5.8)
RBC # BLD AUTO: 2.95 X10(6)UL (ref 3.8–5.8)
RBC # BLD AUTO: 3.01 X10(6)UL (ref 3.8–5.8)
RBC # BLD AUTO: 3.05 X10(6)UL (ref 3.8–5.8)
RH BLOOD TYPE: POSITIVE
SODIUM SERPL-SCNC: 138 MMOL/L (ref 136–145)
SODIUM SERPL-SCNC: 139 MMOL/L (ref 136–145)
SODIUM SERPL-SCNC: 148 MMOL/L (ref 136–145)
T AXIS: 39 DEGREES
T4 FREE SERPL-MCNC: 1.8 NG/DL (ref 0.8–1.7)
TSI SER-ACNC: 0.15 MIU/ML (ref 0.36–3.74)
VENTRICULAR RATE: 76 BPM
WBC # BLD AUTO: 10.4 X10(3) UL (ref 4–11)
WBC # BLD AUTO: 10.6 X10(3) UL (ref 4–11)
WBC # BLD AUTO: 11.6 X10(3) UL (ref 4–11)
WBC # BLD AUTO: 4.9 X10(3) UL (ref 4–11)
WBC # BLD AUTO: 5 X10(3) UL (ref 4–11)
WBC # BLD AUTO: 6.8 X10(3) UL (ref 4–11)
WBC # BLD AUTO: 7.7 X10(3) UL (ref 4–11)

## 2020-01-01 PROCEDURE — 99232 SBSQ HOSP IP/OBS MODERATE 35: CPT | Performed by: INTERNAL MEDICINE

## 2020-01-01 PROCEDURE — 99223 1ST HOSP IP/OBS HIGH 75: CPT | Performed by: INTERNAL MEDICINE

## 2020-01-01 PROCEDURE — 99221 1ST HOSP IP/OBS SF/LOW 40: CPT | Performed by: NURSE PRACTITIONER

## 2020-01-01 PROCEDURE — 99233 SBSQ HOSP IP/OBS HIGH 50: CPT | Performed by: INTERNAL MEDICINE

## 2020-01-01 PROCEDURE — 99233 SBSQ HOSP IP/OBS HIGH 50: CPT | Performed by: OTHER

## 2020-01-01 PROCEDURE — 0X380ZZ CONTROL BLEEDING IN RIGHT UPPER ARM, OPEN APPROACH: ICD-10-PCS | Performed by: SURGERY

## 2020-01-01 PROCEDURE — B548ZZA ULTRASONOGRAPHY OF SUPERIOR VENA CAVA, GUIDANCE: ICD-10-PCS | Performed by: RADIOLOGY

## 2020-01-01 PROCEDURE — 30233N1 TRANSFUSION OF NONAUTOLOGOUS RED BLOOD CELLS INTO PERIPHERAL VEIN, PERCUTANEOUS APPROACH: ICD-10-PCS | Performed by: HOSPITALIST

## 2020-01-01 PROCEDURE — 99221 1ST HOSP IP/OBS SF/LOW 40: CPT | Performed by: INTERNAL MEDICINE

## 2020-01-01 PROCEDURE — 5A1D70Z PERFORMANCE OF URINARY FILTRATION, INTERMITTENT, LESS THAN 6 HOURS PER DAY: ICD-10-PCS | Performed by: INTERNAL MEDICINE

## 2020-01-01 PROCEDURE — 99223 1ST HOSP IP/OBS HIGH 75: CPT | Performed by: OTHER

## 2020-01-01 PROCEDURE — 90935 HEMODIALYSIS ONE EVALUATION: CPT | Performed by: INTERNAL MEDICINE

## 2020-01-01 PROCEDURE — 02PY33Z REMOVAL OF INFUSION DEVICE FROM GREAT VESSEL, PERCUTANEOUS APPROACH: ICD-10-PCS | Performed by: RADIOLOGY

## 2020-01-01 PROCEDURE — 05WY07Z REVISION OF AUTOLOGOUS TISSUE SUBSTITUTE IN UPPER VEIN, OPEN APPROACH: ICD-10-PCS | Performed by: SURGERY

## 2020-01-01 PROCEDURE — 06H033Z INSERTION OF INFUSION DEVICE INTO INFERIOR VENA CAVA, PERCUTANEOUS APPROACH: ICD-10-PCS | Performed by: RADIOLOGY

## 2020-01-01 PROCEDURE — 99231 SBSQ HOSP IP/OBS SF/LOW 25: CPT | Performed by: NURSE PRACTITIONER

## 2020-01-01 PROCEDURE — 99233 SBSQ HOSP IP/OBS HIGH 50: CPT | Performed by: NURSE PRACTITIONER

## 2020-01-01 PROCEDURE — 03L70ZZ OCCLUSION OF RIGHT BRACHIAL ARTERY, OPEN APPROACH: ICD-10-PCS | Performed by: SURGERY

## 2020-01-01 PROCEDURE — B518ZZA FLUOROSCOPY OF SUPERIOR VENA CAVA, GUIDANCE: ICD-10-PCS | Performed by: RADIOLOGY

## 2020-01-01 PROCEDURE — B519ZZA FLUOROSCOPY OF INFERIOR VENA CAVA, GUIDANCE: ICD-10-PCS | Performed by: RADIOLOGY

## 2020-01-01 PROCEDURE — 70450 CT HEAD/BRAIN W/O DYE: CPT | Performed by: EMERGENCY MEDICINE

## 2020-01-01 PROCEDURE — 70553 MRI BRAIN STEM W/O & W/DYE: CPT | Performed by: INTERNAL MEDICINE

## 2020-01-01 PROCEDURE — 99232 SBSQ HOSP IP/OBS MODERATE 35: CPT | Performed by: HOSPITALIST

## 2020-01-01 PROCEDURE — 71045 X-RAY EXAM CHEST 1 VIEW: CPT | Performed by: EMERGENCY MEDICINE

## 2020-01-01 PROCEDURE — 02HV33Z INSERTION OF INFUSION DEVICE INTO SUPERIOR VENA CAVA, PERCUTANEOUS APPROACH: ICD-10-PCS | Performed by: RADIOLOGY

## 2020-01-01 PROCEDURE — 03WY07Z REVISION OF AUTOLOGOUS TISSUE SUBSTITUTE IN UPPER ARTERY, OPEN APPROACH: ICD-10-PCS | Performed by: SURGERY

## 2020-01-01 PROCEDURE — 0JH63XZ INSERTION OF TUNNELED VASCULAR ACCESS DEVICE INTO CHEST SUBCUTANEOUS TISSUE AND FASCIA, PERCUTANEOUS APPROACH: ICD-10-PCS | Performed by: RADIOLOGY

## 2020-01-01 PROCEDURE — 05LD0ZZ OCCLUSION OF RIGHT CEPHALIC VEIN, OPEN APPROACH: ICD-10-PCS | Performed by: SURGERY

## 2020-01-01 PROCEDURE — 0JCD0ZZ EXTIRPATION OF MATTER FROM RIGHT UPPER ARM SUBCUTANEOUS TISSUE AND FASCIA, OPEN APPROACH: ICD-10-PCS | Performed by: SURGERY

## 2020-01-01 RX ORDER — HEPARIN SODIUM 1000 [USP'U]/ML
1.5 INJECTION, SOLUTION INTRAVENOUS; SUBCUTANEOUS ONCE
Status: COMPLETED | OUTPATIENT
Start: 2020-01-01 | End: 2020-01-01

## 2020-01-01 RX ORDER — EPHEDRINE SULFATE 50 MG/ML
INJECTION, SOLUTION INTRAVENOUS AS NEEDED
Status: DISCONTINUED | OUTPATIENT
Start: 2020-01-01 | End: 2020-01-01 | Stop reason: SURG

## 2020-01-01 RX ORDER — SODIUM CHLORIDE, SODIUM LACTATE, POTASSIUM CHLORIDE, CALCIUM CHLORIDE 600; 310; 30; 20 MG/100ML; MG/100ML; MG/100ML; MG/100ML
INJECTION, SOLUTION INTRAVENOUS CONTINUOUS
Status: DISCONTINUED | OUTPATIENT
Start: 2020-01-01 | End: 2020-01-01 | Stop reason: HOSPADM

## 2020-01-01 RX ORDER — LIDOCAINE HYDROCHLORIDE 10 MG/ML
INJECTION, SOLUTION EPIDURAL; INFILTRATION; INTRACAUDAL; PERINEURAL AS NEEDED
Status: DISCONTINUED | OUTPATIENT
Start: 2020-01-01 | End: 2020-01-01 | Stop reason: SURG

## 2020-01-01 RX ORDER — NALOXONE HYDROCHLORIDE 0.4 MG/ML
80 INJECTION, SOLUTION INTRAMUSCULAR; INTRAVENOUS; SUBCUTANEOUS AS NEEDED
Status: DISCONTINUED | OUTPATIENT
Start: 2020-01-01 | End: 2020-01-01 | Stop reason: HOSPADM

## 2020-01-01 RX ORDER — SODIUM CHLORIDE 9 MG/ML
INJECTION, SOLUTION INTRAVENOUS CONTINUOUS
Status: DISCONTINUED | OUTPATIENT
Start: 2020-01-01 | End: 2020-01-01

## 2020-01-01 RX ORDER — BUPIVACAINE HYDROCHLORIDE 5 MG/ML
INJECTION, SOLUTION EPIDURAL; INTRACAUDAL AS NEEDED
Status: DISCONTINUED | OUTPATIENT
Start: 2020-01-01 | End: 2020-01-01

## 2020-01-01 RX ORDER — LIDOCAINE HYDROCHLORIDE AND EPINEPHRINE 10; 10 MG/ML; UG/ML
INJECTION, SOLUTION INFILTRATION; PERINEURAL
Status: COMPLETED
Start: 2020-01-01 | End: 2020-01-01

## 2020-01-01 RX ORDER — LORAZEPAM 2 MG/ML
0.5 INJECTION INTRAMUSCULAR
Status: DISCONTINUED | OUTPATIENT
Start: 2020-01-01 | End: 2020-01-01

## 2020-01-01 RX ORDER — DEXAMETHASONE SODIUM PHOSPHATE 4 MG/ML
4 VIAL (ML) INJECTION EVERY 12 HOURS
Status: DISCONTINUED | OUTPATIENT
Start: 2020-01-01 | End: 2020-01-01

## 2020-01-01 RX ORDER — DEXAMETHASONE 4 MG/1
4 TABLET ORAL DAILY
Status: COMPLETED | OUTPATIENT
Start: 2020-01-01 | End: 2020-01-01

## 2020-01-01 RX ORDER — ONDANSETRON 2 MG/ML
INJECTION INTRAMUSCULAR; INTRAVENOUS AS NEEDED
Status: DISCONTINUED | OUTPATIENT
Start: 2020-01-01 | End: 2020-01-01 | Stop reason: SURG

## 2020-01-01 RX ORDER — ONDANSETRON 2 MG/ML
4 INJECTION INTRAMUSCULAR; INTRAVENOUS AS NEEDED
Status: DISCONTINUED | OUTPATIENT
Start: 2020-01-01 | End: 2020-01-01 | Stop reason: HOSPADM

## 2020-01-01 RX ORDER — MORPHINE SULFATE 4 MG/ML
2 INJECTION, SOLUTION INTRAMUSCULAR; INTRAVENOUS
Status: DISCONTINUED | OUTPATIENT
Start: 2020-01-01 | End: 2020-01-01

## 2020-01-01 RX ORDER — LIDOCAINE HYDROCHLORIDE 10 MG/ML
INJECTION, SOLUTION INFILTRATION; PERINEURAL
Status: COMPLETED
Start: 2020-01-01 | End: 2020-01-01

## 2020-01-01 RX ORDER — PHENYLEPHRINE HCL 10 MG/ML
VIAL (ML) INJECTION AS NEEDED
Status: DISCONTINUED | OUTPATIENT
Start: 2020-01-01 | End: 2020-01-01 | Stop reason: SURG

## 2020-01-01 RX ORDER — ACETAMINOPHEN 325 MG/1
650 TABLET ORAL EVERY 6 HOURS PRN
Status: DISCONTINUED | OUTPATIENT
Start: 2020-01-01 | End: 2020-01-01

## 2020-01-01 RX ORDER — HYDROCODONE BITARTRATE AND ACETAMINOPHEN 10; 325 MG/1; MG/1
1 TABLET ORAL EVERY 4 HOURS PRN
Status: DISCONTINUED | OUTPATIENT
Start: 2020-01-01 | End: 2020-01-01

## 2020-01-01 RX ORDER — DEXAMETHASONE 2 MG/1
2 TABLET ORAL EVERY 12 HOURS SCHEDULED
Qty: 60 TABLET | Refills: 1 | Status: SHIPPED | OUTPATIENT
Start: 2020-01-01

## 2020-01-01 RX ORDER — MIDODRINE HYDROCHLORIDE 10 MG/1
10 TABLET ORAL
Status: DISCONTINUED | OUTPATIENT
Start: 2020-01-01 | End: 2020-01-01

## 2020-01-01 RX ORDER — MEPERIDINE HYDROCHLORIDE 25 MG/ML
12.5 INJECTION INTRAMUSCULAR; INTRAVENOUS; SUBCUTANEOUS AS NEEDED
Status: DISCONTINUED | OUTPATIENT
Start: 2020-01-01 | End: 2020-01-01 | Stop reason: HOSPADM

## 2020-01-01 RX ORDER — DIVALPROEX SODIUM 250 MG/1
250 TABLET, DELAYED RELEASE ORAL 2 TIMES DAILY
Qty: 60 TABLET | Refills: 1 | Status: SHIPPED | OUTPATIENT
Start: 2020-01-01

## 2020-01-01 RX ORDER — MORPHINE SULFATE 4 MG/ML
8 INJECTION, SOLUTION INTRAMUSCULAR; INTRAVENOUS
Status: DISCONTINUED | OUTPATIENT
Start: 2020-01-01 | End: 2020-01-01

## 2020-01-01 RX ORDER — CEFAZOLIN SODIUM/WATER 2 G/20 ML
SYRINGE (ML) INTRAVENOUS AS NEEDED
Status: DISCONTINUED | OUTPATIENT
Start: 2020-01-01 | End: 2020-01-01 | Stop reason: SURG

## 2020-01-01 RX ORDER — LEVOTHYROXINE SODIUM 0.12 MG/1
125 TABLET ORAL
Qty: 30 TABLET | Refills: 2 | Status: SHIPPED | OUTPATIENT
Start: 2020-01-01

## 2020-01-01 RX ORDER — MIDAZOLAM HYDROCHLORIDE 1 MG/ML
1 INJECTION INTRAMUSCULAR; INTRAVENOUS EVERY 5 MIN PRN
Status: DISCONTINUED | OUTPATIENT
Start: 2020-01-01 | End: 2020-01-01 | Stop reason: HOSPADM

## 2020-01-01 RX ORDER — HYDROCODONE BITARTRATE AND ACETAMINOPHEN 5; 325 MG/1; MG/1
1 TABLET ORAL AS NEEDED
Status: DISCONTINUED | OUTPATIENT
Start: 2020-01-01 | End: 2020-01-01 | Stop reason: HOSPADM

## 2020-01-01 RX ORDER — HEPARIN SODIUM 5000 [USP'U]/ML
INJECTION, SOLUTION INTRAVENOUS; SUBCUTANEOUS
Status: COMPLETED
Start: 2020-01-01 | End: 2020-01-01

## 2020-01-01 RX ORDER — DEXAMETHASONE SODIUM PHOSPHATE 4 MG/ML
4 VIAL (ML) INJECTION EVERY 6 HOURS
Status: DISCONTINUED | OUTPATIENT
Start: 2020-01-01 | End: 2020-01-01

## 2020-01-01 RX ORDER — DEXAMETHASONE SODIUM PHOSPHATE 4 MG/ML
8 VIAL (ML) INJECTION AS NEEDED
Status: DISCONTINUED | OUTPATIENT
Start: 2020-01-01 | End: 2020-01-01 | Stop reason: HOSPADM

## 2020-01-01 RX ORDER — LEVOTHYROXINE SODIUM 0.12 MG/1
125 TABLET ORAL
Status: DISCONTINUED | OUTPATIENT
Start: 2020-01-01 | End: 2020-01-01

## 2020-01-01 RX ORDER — LORAZEPAM 2 MG/ML
0.5 INJECTION INTRAMUSCULAR AS NEEDED
Status: DISCONTINUED | OUTPATIENT
Start: 2020-01-01 | End: 2020-01-01

## 2020-01-01 RX ORDER — SODIUM CHLORIDE 9 MG/ML
INJECTION, SOLUTION INTRAVENOUS ONCE
Status: DISCONTINUED | OUTPATIENT
Start: 2020-01-01 | End: 2020-01-01

## 2020-01-01 RX ORDER — DEXAMETHASONE 2 MG/1
2 TABLET ORAL DAILY
Status: DISCONTINUED | OUTPATIENT
Start: 2020-01-01 | End: 2020-01-01

## 2020-01-01 RX ORDER — SODIUM CHLORIDE 9 MG/ML
INJECTION, SOLUTION INTRAVENOUS ONCE
Status: COMPLETED | OUTPATIENT
Start: 2020-01-01 | End: 2020-01-01

## 2020-01-01 RX ORDER — DEXAMETHASONE SODIUM PHOSPHATE 4 MG/ML
10 VIAL (ML) INJECTION ONCE
Status: COMPLETED | OUTPATIENT
Start: 2020-01-01 | End: 2020-01-01

## 2020-01-01 RX ORDER — HEPARIN SODIUM 5000 [USP'U]/ML
5000 INJECTION, SOLUTION INTRAVENOUS; SUBCUTANEOUS EVERY 12 HOURS SCHEDULED
Status: DISCONTINUED | OUTPATIENT
Start: 2020-01-01 | End: 2020-01-01

## 2020-01-01 RX ORDER — METOCLOPRAMIDE HYDROCHLORIDE 5 MG/ML
10 INJECTION INTRAMUSCULAR; INTRAVENOUS AS NEEDED
Status: DISCONTINUED | OUTPATIENT
Start: 2020-01-01 | End: 2020-01-01 | Stop reason: HOSPADM

## 2020-01-01 RX ORDER — DEXAMETHASONE 4 MG/1
4 TABLET ORAL DAILY
Status: DISCONTINUED | OUTPATIENT
Start: 2020-01-01 | End: 2020-01-01

## 2020-01-01 RX ORDER — HEPARIN SODIUM 1000 [USP'U]/ML
1.5 INJECTION, SOLUTION INTRAVENOUS; SUBCUTANEOUS
Status: DISCONTINUED | OUTPATIENT
Start: 2020-01-01 | End: 2020-01-01

## 2020-01-01 RX ORDER — HYDROMORPHONE HYDROCHLORIDE 1 MG/ML
0.4 INJECTION, SOLUTION INTRAMUSCULAR; INTRAVENOUS; SUBCUTANEOUS EVERY 5 MIN PRN
Status: DISCONTINUED | OUTPATIENT
Start: 2020-01-01 | End: 2020-01-01 | Stop reason: HOSPADM

## 2020-01-01 RX ORDER — ALBUMIN (HUMAN) 12.5 G/50ML
100 SOLUTION INTRAVENOUS AS NEEDED
Status: DISCONTINUED | OUTPATIENT
Start: 2020-01-01 | End: 2020-01-01

## 2020-01-01 RX ORDER — METOCLOPRAMIDE HYDROCHLORIDE 5 MG/ML
INJECTION INTRAMUSCULAR; INTRAVENOUS AS NEEDED
Status: DISCONTINUED | OUTPATIENT
Start: 2020-01-01 | End: 2020-01-01 | Stop reason: SURG

## 2020-01-01 RX ORDER — FOLIC ACID/VIT B COMPLEX AND C 0.8 MG
0.8 TABLET ORAL DAILY
Qty: 90 TABLET | Refills: 1 | Status: SHIPPED | OUTPATIENT
Start: 2020-01-01

## 2020-01-01 RX ORDER — MORPHINE SULFATE 4 MG/ML
4 INJECTION, SOLUTION INTRAMUSCULAR; INTRAVENOUS
Status: DISCONTINUED | OUTPATIENT
Start: 2020-01-01 | End: 2020-01-01

## 2020-01-01 RX ORDER — VANCOMYCIN HYDROCHLORIDE
25 ONCE
Status: COMPLETED | OUTPATIENT
Start: 2020-01-01 | End: 2020-01-01

## 2020-01-01 RX ORDER — ATORVASTATIN CALCIUM 40 MG/1
40 TABLET, FILM COATED ORAL NIGHTLY
Status: DISCONTINUED | OUTPATIENT
Start: 2020-01-01 | End: 2020-01-01

## 2020-01-01 RX ORDER — DEXAMETHASONE 2 MG/1
2 TABLET ORAL EVERY 12 HOURS SCHEDULED
Qty: 30 TABLET | Refills: 1 | Status: SHIPPED | OUTPATIENT
Start: 2020-01-01 | End: 2020-01-01

## 2020-01-01 RX ORDER — HYDROCODONE BITARTRATE AND ACETAMINOPHEN 5; 325 MG/1; MG/1
2 TABLET ORAL AS NEEDED
Status: DISCONTINUED | OUTPATIENT
Start: 2020-01-01 | End: 2020-01-01 | Stop reason: HOSPADM

## 2020-01-01 RX ORDER — DIVALPROEX SODIUM 250 MG/1
250 TABLET, DELAYED RELEASE ORAL EVERY 12 HOURS SCHEDULED
Status: DISCONTINUED | OUTPATIENT
Start: 2020-01-01 | End: 2020-01-01

## 2020-01-01 RX ORDER — DEXAMETHASONE 2 MG/1
2 TABLET ORAL EVERY 12 HOURS SCHEDULED
Status: DISCONTINUED | OUTPATIENT
Start: 2020-01-01 | End: 2020-01-01

## 2020-01-01 RX ORDER — MIDAZOLAM HYDROCHLORIDE 1 MG/ML
INJECTION INTRAMUSCULAR; INTRAVENOUS
Status: COMPLETED
Start: 2020-01-01 | End: 2020-01-01

## 2020-01-01 RX ORDER — CEFAZOLIN SODIUM 1 G/3ML
INJECTION, POWDER, FOR SOLUTION INTRAMUSCULAR; INTRAVENOUS
Status: COMPLETED
Start: 2020-01-01 | End: 2020-01-01

## 2020-01-01 RX ORDER — HALOPERIDOL 5 MG/ML
1 INJECTION INTRAMUSCULAR EVERY 6 HOURS PRN
Status: DISCONTINUED | OUTPATIENT
Start: 2020-01-01 | End: 2020-01-01

## 2020-01-01 RX ADMIN — EPHEDRINE SULFATE 10 MG: 50 INJECTION, SOLUTION INTRAVENOUS at 19:17:00

## 2020-01-01 RX ADMIN — SODIUM CHLORIDE: 9 INJECTION, SOLUTION INTRAVENOUS at 19:05:00

## 2020-01-01 RX ADMIN — PHENYLEPHRINE HCL 50 MCG: 10 MG/ML VIAL (ML) INJECTION at 19:20:00

## 2020-01-01 RX ADMIN — ONDANSETRON 4 MG: 2 INJECTION INTRAMUSCULAR; INTRAVENOUS at 16:54:00

## 2020-01-01 RX ADMIN — LIDOCAINE HYDROCHLORIDE 50 MG: 10 INJECTION, SOLUTION EPIDURAL; INFILTRATION; INTRACAUDAL; PERINEURAL at 19:10:00

## 2020-01-01 RX ADMIN — CEFAZOLIN SODIUM/WATER 2 G: 2 G/20 ML SYRINGE (ML) INTRAVENOUS at 16:40:00

## 2020-01-01 RX ADMIN — METOCLOPRAMIDE HYDROCHLORIDE 20 MG: 5 INJECTION INTRAMUSCULAR; INTRAVENOUS at 16:54:00

## 2020-01-01 RX ADMIN — EPHEDRINE SULFATE 10 MG: 50 INJECTION, SOLUTION INTRAVENOUS at 19:45:00

## 2020-01-01 RX ADMIN — PHENYLEPHRINE HCL 50 MCG: 10 MG/ML VIAL (ML) INJECTION at 19:49:00

## 2020-01-01 RX ADMIN — LIDOCAINE HYDROCHLORIDE 50 MG: 10 INJECTION, SOLUTION EPIDURAL; INFILTRATION; INTRACAUDAL; PERINEURAL at 16:25:00

## 2020-01-01 RX ADMIN — PHENYLEPHRINE HCL 50 MCG: 10 MG/ML VIAL (ML) INJECTION at 19:47:00

## 2020-01-01 RX ADMIN — PHENYLEPHRINE HCL 50 MCG: 10 MG/ML VIAL (ML) INJECTION at 19:27:00

## 2020-01-15 PROBLEM — R41.0 CONFUSED: Status: ACTIVE | Noted: 2020-01-01

## 2020-01-15 NOTE — ED PROVIDER NOTES
Patient Seen in: [facility] Emergency Department      History   Patient presents with:  Altered Mental Status  Other    Stated Complaint: ams    HPI    17-year-old male brought in by family for confusion and generalized fatigue.   Patient has history Exam     ED Triage Vitals [01/15/20 1349]   BP (!) 189/84   Pulse 79   Resp 18   Temp    Temp src    SpO2 100 %   O2 Device None (Room air)       Current:BP (!) 191/92   Pulse 77   Resp 20   Ht 167.6 cm (5' 6\")   Wt 60.8 kg   SpO2 100%   BMI 21.63 kg/m² panel order CBC WITH DIFFERENTIAL WITH PLATELET.   Procedure                               Abnormality         Status                     ---------                               -----------         ------                     CBC W/ DIFFERENTIAL[[id number]] acute intracranial hemorrhage or evidence of acute territorial infarction.   2. 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 No evidence of midline shift. Neurology and neurosurgery placed on consult. Patient was evaluated at the bedside by nephrology and there is concern for infection to the fistula. A temporary catheter will be placed for dialysis.

## 2020-01-15 NOTE — ED INITIAL ASSESSMENT (HPI)
Pts daughter states pt has been getting confused after his dialysis for the past few weeks, she states he would be confused for that day but then get better. Pts daughter states that pt has been constantly confused since Tuesday.          Pt is a F HD pt

## 2020-01-15 NOTE — PROCEDURES
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

## 2020-01-15 NOTE — CONSULTS
[facility]  Report of Consultation    Collettfelix Godwindarien Patient Status:  Emergency    1932 MRN [id number]   Location [address] [facility] Attending Ivon Sheht MD   Hosp Day # 0 PCP Tahir Kamara MD     Reason for Consultation:  ESR Carbonate Antacid 500 MG Oral Chew Tab, Chew 1 tablet by mouth daily. Cholecalciferol (VITAMIN D3) 08131 units Oral Cap, Take by mouth once a week. metoprolol Tartrate 25 MG Oral Tab, Take 0.5 tablets (12.5 mg total) by mouth 2 (two) times daily.  (Luis Antonio BUN (mg/dL)   Date Value   01/15/2020 70 (H)   11/15/2019 83 (H)   11/14/2019 84 (H)     Creatinine (mg/dL)   Date Value   01/15/2020 14.00 (H)   11/15/2019 7.76 (H)   11/14/2019 7.20 (H)         Imaging:  Reviewed    Impression:  1.  ESRD ; on HD MWF;

## 2020-01-15 NOTE — H&P
[facility]  History and Physical     Tristan Summers Patient Status:  Emergency    1932 MRN [id number]   Location [facility] Attending No att. providers found   [facility] Day # 0 PCP Harding Felty, MD     Chief Complaint:  We Levothyroxine Sodium 150 MCG Oral Tab, Take 1 tablet (150 mcg total) by mouth before breakfast., Disp: 30 tablet, Rfl: 1  Midodrine HCl 10 MG Oral Tab, Take 10 mg by mouth 3 (three) times daily.  On dialysis days (Monday, Wednesday, Friday), Disp: , Rfl: affect.       Diagnostic Data:      Labs:  Recent Labs   Lab 01/15/20  1355   WBC 6.8   HGB 8.6*   MCV 97.4   .0       Recent Labs   Lab 01/15/20  1355   *   BUN 70*   CREATSERUM 14.00*   GFRAA 3*   GFRNAA 3*   CA 7.8*   ALB 3.2*   *   K

## 2020-01-15 NOTE — ED NOTES
Report called to RN on CTU7, RN aware that pt is in IR and will come to the floor once they are done.  RN aware that abx need to be hung and 10mg IV decadron once ordered by WARREN JARAMILLO

## 2020-01-16 NOTE — PROGRESS NOTES
[facility]  Progress Note    Kaiden Morton Patient Status:  Emergency    1932 MRN [id number]   Location [address] [facility] Attending Cecilia Adamson MD   Hosp Day # 1 PCP Layla Pantoja MD     Patient in restraints; s/p HD [facility]; as above  Recent Labs     01/15/20  1355 01/16/20  0704   WBC 6.8 4.9   HGB 8.6* 8.2*   MCV 97.4 95.4   .0 149.0*       Recent Labs     01/15/20  1355 01/16/20  0704   * 139   K 3.7 3.4*    103   CO2 26.0 27.0   BUN 70* 24

## 2020-01-16 NOTE — PROGRESS NOTES
[facility] Dosing Service    Initial Pharmacokinetic Consult for Vancomycin Dosing     Dayne Mesa is a 80year old male who is being treated for potentially infected fistula .   Pharmacy has been asked to dose Vancomycin by Dr. Yisel Rouse    He has No Known

## 2020-01-16 NOTE — CONSULTS
[facility]  Vascular Surgery Consultation    Diimtrios Manzanares Patient Status:  Inpatient    1932 MRN [id number]   [facility] 7NE-A Attending Seymour Luciano, DO   Hosp Day # 0 PCP Kimberly Porter MD     Reason for Consultation:  Infected lef mg/kg, Intravenous, Once  •  Piperacillin Sod-Tazobactam So (ZOSYN) 3.375 g in dextrose 5 % 100 mL ADD-vantage, 3.375 g, Intravenous, Once  •  dexamethasone Sodium Phosphate (DECADRON) 4 MG/ML injection 10 mg, 10 mg, Intravenous, Once    Review of Systems: 01/15/2020    CA 7.8 01/15/2020    ALB 3.2 01/15/2020    ALKPHO 71 01/15/2020    BILT 0.4 01/15/2020    TP 7.4 01/15/2020    AST 15 01/15/2020    ALT 11 01/15/2020       Impression and Plan:  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

## 2020-01-16 NOTE — PROGRESS NOTES
[facility]  Progress Note     Sienna Blanco Patient Status:  Inpatient    1932 MRN [id number]   [facility] 7NE-A Attending Kaylan Carmen, DO   Hosp Day # 1 PCP Andrés Hernandez MD     Chief Complaint: weakness    S: Patient slightly m hours. Lab Results   Component Value Date    TSH 0.149 01/15/2020    T4F 1.8 01/15/2020       Imaging: Imaging data reviewed in Epic.     Medications:   • atorvastatin  40 mg Oral Nightly   • Levothyroxine Sodium  125 mcg Oral Before breakfast   • metopr

## 2020-01-16 NOTE — BRIEF OP NOTE
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  Ligation of av fistula and excision of infected aneurysm of fistula    Surgeon(s) and Role:     Chasity Jenkins MD

## 2020-01-16 NOTE — ANESTHESIA POSTPROCEDURE EVALUATION
[facility] Patient Status:  Inpatient   Age/Gender 80year old male MRN [id number]   Location [address] [facility] Attending Nisha Reyes, [facility] Day # 1 PCP Asha Cesar MD       Anesthesia Post-op Note    Procedure

## 2020-01-16 NOTE — PROGRESS NOTES
01/16/20 1244   Clinical Encounter Type   Visited With Health care provider   Referral To Nurse  (Patient not oriented.  provided POLST form to patient's chart.    remains available when HPOA returns to the hospital.  Please page a chapla

## 2020-01-16 NOTE — PROGRESS NOTES
[facility] Dosing Service  Antibiotic Dosing    Brittnee Mary is a 80year old male for whom pharmacy is dosing Zosyn for possible infected fistula. Allergies: has No Known Allergies.     Vitals: BP (!) 186/76 (BP Location: Left arm)   Pulse 75   Temp

## 2020-01-16 NOTE — PLAN OF CARE
Assumed care of pt at 1900. Pt confused. Combative. RA.  . Daughter at bedside. Restraint protocol. Excision of AV Graft to RUE to be done 1/16. Daughter would not sign consent even with education from both RN.   Dr. Leon Wray partner notified that d

## 2020-01-16 NOTE — PROGRESS NOTES
Spoke with pharmacy regarding Vanco administration and dialysis treatment time frame on 1/16. Vanco was administered on 1/15/20 at 18:42.  Dialysis start time 21:12 on 1/15/20 and end time 1/16/20 at 00:45. 2 L off with HD.

## 2020-01-16 NOTE — CONSULTS
[address] [facility] Initial Evaluation    Babin Wheatland Patient Status:  Inpatient    1932 MRN [id number]   [facility] 7NE-A Attending Christiana Sandoval, DO   Hosp Day # 1 PCP Oswaldo Lara MD     REASON FOR CONSULTATION:  Confusion 30 tablet, Rfl: 1, 1/15/2020 at Unknown time  Calcium Carbonate Antacid 500 MG Oral Chew Tab, Chew 1 tablet by mouth daily. , Disp: , Rfl: , 1/15/2020 at Unknown time  Cholecalciferol (VITAMIN D3) 73968 units Oral Cap, Take by mouth once a week., Disp: , Rf Intravenous, See Admin Instructions  Piperacillin Sod-Tazobactam So (ZOSYN) 3.375 g in dextrose 5 % 100 mL ADD-vantage, 3.375 g, Intravenous, 2 times per day        REVIEW OF SYSTEMS:  A 10-point system was reviewed.   Pertinent positives and negatives are status changes - neurosurgery following     Agree with prophylaxis for seizures - given renal insufficiency, will start Depacon 250 mg bid for now     Plan  - Depacon 250 mg bid IV for now for seizure ppx  - CT brain reviewed - neurosurgery following - phuc

## 2020-01-16 NOTE — PROCEDURES
[facility] [facility] Patient Status:  Emergency    1932 MRN [id number]   Location [address] [facility] Attending No att. providers found   Hosp Day # 0 PCP Al Lopez MD         Brief Procedure Report    Pre-Operative Lizzette

## 2020-01-16 NOTE — PLAN OF CARE
Received pt from IR. Pt admitted to [address] [place] [address]. SANDRA orientation. Per  Pt daughter, pt is confused and does not speak any english although  He does understand some english. RA. NSR on tele. SBP in the 180s. Consults notified of new admission.  Dialysis called in

## 2020-01-16 NOTE — ANESTHESIA PREPROCEDURE EVALUATION
PRE-OP EVALUATION    Patient Name: Fariha Hendrickson    Pre-op Diagnosis:     Procedure(s): infected av fistula with necrosis, right arm  Excision of AV Graft RUE    Surgeon(s) and Role:     Suraj Nolen MD - Primary    Pre-op vitals reviewed.   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 No notable dental history. Pulmonary    Pulmonary exam normal.  Breath sounds clear to auscultation bilaterally. Other findings            ASA: 3   Plan: general  NPO status verified and patient meets guidelines.         C

## 2020-01-16 NOTE — ANESTHESIA PROCEDURE NOTES
Airway  Urgency: elective    Airway not difficult    General Information and Staff    Patient location during procedure: OR  Anesthesiologist: Iris Lee MD  Performed: anesthesiologist     Indications and Patient Condition  Indications for airway manage

## 2020-01-16 NOTE — PROGRESS NOTES
[address] [facility] Initial Evaluation    Burton Hernandez Patient Status:  Inpatient    1932 MRN [id number]   [facility] 7NE-A Attending Diego Michael, DO   Hosp Day # 1 PCP Haley Child MD     REASON FOR CONSULTATION:  Confusion 1/15/2020 at Unknown time  Calcium Carbonate Antacid 500 MG Oral Chew Tab, Chew 1 tablet by mouth daily. , Disp: , Rfl: , 1/15/2020 at Unknown time  Cholecalciferol (VITAMIN D3) 67928 units Oral Cap, Take by mouth once a week., Disp: , Rfl: , 1/11/2020 at U Instructions  Piperacillin Sod-Tazobactam So (ZOSYN) 3.375 g in dextrose 5 % 100 mL ADD-vantage, 3.375 g, Intravenous, 2 times per day        REVIEW OF SYSTEMS:  A 10-point system was reviewed. Pertinent positives and negatives are noted in HPI.       PHYS that has worsened since November. Reviewed CT brain and NS following. Could consider prophylaxis AED. Dr. Adonna Goltz to follow with full consult.     HUYEN Teixeira  [employer] [id number]  Pager [phone]  1/16/2020, 11:18 AM

## 2020-01-16 NOTE — CONSULTS
[facility]  Neurosurgery Consult    Jordy Hidalgo Patient Status:  Inpatient    1932 MRN [id number]   [facility] 7NE-A Attending Kody Cho, DO   Hosp Day # 1 PCP Felix Malcolm MD     REASON FOR CONSULTATION:  Brain mass    HISTORY HISTORY:  family history includes Hypertension in his brother; Other in his brother. SOCIAL HISTORY:   reports that he quit smoking about 43 years ago.  He has never used smokeless tobacco.    ALLERGIES:  No Known Allergies    MEDICATIONS:  Levothyroxine So (ZOSYN) 3.375 g in dextrose 5 % 100 mL ADD-vantage, 3.375 g, Intravenous, 2 times per day        REVIEW OF SYSTEMS:  A 10-point system was reviewed. Pertinent positives and negatives are noted in HPI.       PHYSICAL EXAMINATION:  VITAL SIGNS: /76 thickening. Left intra-ocular lens implant.        =====  CONCLUSION:       1. No acute intracranial hemorrhage or evidence of acute territorial infarction.      2. There is a mass along the anterior left temporal lobe measuring up to 3.5 x 3.3 cm with mod

## 2020-01-16 NOTE — PROGRESS NOTES
I had another lengthy conversation with the daughter this morning. We spoke for 15 minutes reviewing her father's case. 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   I discussed my concern that if the skin necrosis further jaime

## 2020-01-17 PROBLEM — Z71.89 GOALS OF CARE, COUNSELING/DISCUSSION: Status: ACTIVE | Noted: 2020-01-01

## 2020-01-17 NOTE — PROGRESS NOTES
I note that Dr Sally Rodriguez is [facility] PCP  A search revealed that his office number in [place] is [phone]  I called his office and spoke with Dr Yakov Gonzalez directly    I explained our role in [facility] medical care.     We reviewed the results of his brain

## 2020-01-17 NOTE — ANESTHESIA POSTPROCEDURE EVALUATION
[facility] Patient Status:  Inpatient   Age/Gender 80year old male MRN [id number]   Location [address] [facility] Attending Vira Ramirez, [facility] Day # 1 PCP Jennifer Vaughn MD       Anesthesia Post-op Note    Procedure

## 2020-01-17 NOTE — CONSULTS
[address] [facility]  [id number]  Hospital Day #2  Date of Consult: 01/17/20  [facility]    Reason for Consultation:      Consult requested by Dr Jared Negrete for evaluation of palliati patient [facility])    • Disorder of thyroid    • Essential hypertension    • High blood pressure    • High cholesterol    • Hyperlipidemia      Past Surgical History:   Procedure Laterality Date   • A.V. FISTULA Right 1/16/2020    Performed by Yesi Felipe Intravenous, Q12H  •  morphINE sulfate (PF) 4 MG/ML injection 2 mg, 2 mg, Intravenous, Q1H PRN **OR** morphINE sulfate (PF) 4 MG/ML injection 4 mg, 4 mg, Intravenous, Q1H PRN **OR** morphINE sulfate (PF) 4 MG/ML injection 8 mg, 8 mg, Intravenous, Q1H PRN ([id number])    Result Date: 1/15/2020  CONCLUSION:   1. No acute intracranial hemorrhage or evidence of acute territorial infarction.   2. There is a mass along the anterior left temporal lobe measuring up to 3.5 x 3.3 cm with moderate surrounding vasogenic lisa wrist restraints secure    Palliative Performance Scale:     Palliative Performance Scale   % Ambulation Activity Level Self-Care Intake Consciousness   100 Full Normal Full   Normal Full   90 Full No disease  Normal Full Normal Full   80 Full Some disease continue life-prolonging measures and treatments  3) Includes treatment of symptoms to improve quality of life while receiving above measures and treatments  4) Consultation services    Hospice services:  1) Phone services 24/7 (they will be your 911 at al Infection of arteriovenous fistula (HCC)        Altered mental status        Goals of care, counseling/discussion      Palliative Care Recommendations/Plan:     1. Goals of care discussions.  Augusto told me that she wishes to continue with current work u

## 2020-01-17 NOTE — ANESTHESIA PROCEDURE NOTES
Airway  Date/Time: 1/16/2020 7:13 PM  Urgency: elective      General Information and Staff    Patient location during procedure: OR  Anesthesiologist: Stacey Blanc MD  Performed: anesthesiologist     Indications and Patient Condition  Indications for ai

## 2020-01-17 NOTE — PLAN OF CARE
Assumed care after patient returned from PACU around 2200. Patient drowsy, but denied pain when asked. 2 units PRBC's ordered by Dr. Juan David Nagel. First unit was infusing when he arrived.  Patient's daughter refusing for patient to get stuck anymore tonight for a report abnormal signs of bleeding to staff    Outcome: Progressing

## 2020-01-17 NOTE — PROGRESS NOTES
Discussed with Dr. Jodie Arias and RN. Per RN, family considering not proceeding with additional HD access procedures. Palliative care consulted. Will defer MRI to rad onc but would recommend not proceeding if pt is to become palliative care or hospice.   Will

## 2020-01-17 NOTE — PROGRESS NOTES
Palliative care order received    Medical chart reviewed and discussed case with our palliative care team who also d/w'd RN's    I called daughter Isa Daley [phone] (listed on ADT sheet) and left a VM message to return phone call for a goals

## 2020-01-17 NOTE — OCCUPATIONAL THERAPY NOTE
Received order for OT evaluation. Per RN, pt is in restraints. Confusion and agitation. Not appropriate for therapy at this time. Will continue to follow.

## 2020-01-17 NOTE — PLAN OF CARE
Assumed care of patient at 07:00 am.  Pt confused, SANDRA A/O, combative at times. Wrist restraints placed, restraint protocol followed. Daughter signed consent for Excision of AV Graft to RUE.      Patient left unit for procedure at 16:45 pm. Patient Jean-Pierre Weinberg routines  Outcome: Progressing

## 2020-01-17 NOTE — PROGRESS NOTES
[address] [facility] Progress Note    Bren Aquino Patient Status:  Inpatient    1932 MRN [id number]   [facility] 7NE-A Attending Jonny Gonzalez,    Hosp Day # 2 PCP Roni Edwards MD         Subjective:  Bren Aquino is a(n) 80 Intravenous Once   • Heparin Sodium (Porcine)  5,000 Units Subcutaneous 2 times per day   • vancomycin  5 mg/kg Intravenous See Admin Instructions   • piperacillin-tazobactam  3.375 g Intravenous 2 times per day       Patient Active Problem List:     Andrew Oshea Palliative consulted     No reported seizures, family to talk with palliative care today   No surgical candidate, radiation oncology consulted   Cont depacon 250 mg BID     Negin Dates, [address]  1/17/2020  10:06 AM  Spectr

## 2020-01-17 NOTE — BRIEF OP NOTE
Pre-Operative Diagnosis: Post op bleeding     Post-Operative Diagnosis: same     Procedure Performed:   Ligation of fistula  Evacuation of hematoma    Surgeon(s) and Role:     Phoebe Pereira MD - Primary    Assistant(s):        Surgical Findings: hemat

## 2020-01-17 NOTE — ANESTHESIA PREPROCEDURE EVALUATION
PRE-OP EVALUATION    Patient Name: Tristan Summers    Pre-op Diagnosis: Bleeding AV Fistula    Procedure(s): AV Fistula re-exploration    Surgeon(s) and Role:     Ruthie Foster MD - Primary    Pre-op vitals reviewed.   Temp: 97.7 °F (36.5 °C)  Pulse: 88  R Admin Instructions  Piperacillin Sod-Tazobactam So (ZOSYN) 3.375 g in dextrose 5 % 100 mL ADD-vantage, 3.375 g, Intravenous, 2 times per day        Outpatient Medications:  No outpatient medications have been marked as taking for the 1/16/20 encounter (Ane Weakness generalized     Hypokalemia     Anemia     Acute kidney injury ([facility] [address]     Chronic renal failure, unspecified CKD stage     Cellulitis     Confused     Brain mass     Infection of arteriovenous fistula [facility])        Past Surgical History:   Procedure pertaining to the proposed anesthetic and postoperative plan for pain, nausea/vomiting were discussed with patient.   Risks of general anesthetic include but not are not limited to adverse reactions related to medications administered, dental damage, and so

## 2020-01-17 NOTE — PROGRESS NOTES
[address] [facility] PROGRESS NOTE    79yo M with multiple medical comorbidities including ESRD on HD. Presented with confusion, CT brain showing 3.5 x 3.3 mass along anterior L temporal lobe with surrounding edema.    Hx of prostate cancer treated with seed brachythera

## 2020-01-17 NOTE — PROGRESS NOTES
Patient resting comfortably in restraints  Palpable radial pulse  Dressing on right arm is dry    Check cbc  Can get perm cath and rad onc eval

## 2020-01-17 NOTE — OPERATIVE REPORT
[facility]    PATIENT'S NAME: SOY VALERIANO   ATTENDING PHYSICIAN: Debbie Jeffries DO   OPERATING PHYSICIAN: Vijay Prather M.D.    PATIENT ACCOUNT#:   [de-identified]    LOCATION:  [address] [place] [address]  MEDICAL RECORD #:   [id number]       DATE OF BIRTH:  [birth year]2 closed the incision with interrupted Vicryl and staples for both incisions. The patient was then taken to Recovery in stable condition.     Dictated By Varun Kay M.D.  d: 01/16/2020 20:08:19  t: 01/16/2020 22:50:18  [facility] [id number]  ZLG/

## 2020-01-17 NOTE — PROGRESS NOTES
[facility]  Progress Note     Tristan Kristopher Patient Status:  Inpatient    1932 MRN [id number]   [facility] 7NE-A Attending Danis Villasenor, DO   Hosp Day # 2 PCP Harding Felty, MD     Chief Complaint: weakness    S: Patient still not Creatinine Clearance: 6.1 mL/min (A) (based on SCr of 7.53 mg/dL (H)). No results for input(s): PTP, INR in the last 168 hours. No results for input(s): TROP, CK in the last 168 hours. Imaging: Imaging data reviewed in Epic.     Medications:

## 2020-01-17 NOTE — PROGRESS NOTES
Patient flailing due to mental state in restraints  Dressing saturated  Return to OR emergently to control bleeding

## 2020-01-18 NOTE — PLAN OF CARE
Assumed care while patient receiving HD. Patient BP stable and tolerated HD well. Patient very alert, although still confused after HD. Patient got very verbal/angry/yelling out and trying to remove restraints.  Kept asking staff to cut this off (while pull

## 2020-01-18 NOTE — PHYSICAL THERAPY NOTE
PHYSICAL THERAPY EVALUATION - INPATIENT     Room Number: 9371/8816-F  Evaluation Date: 1/18/2020  Type of Evaluation: Initial  Physician Order: PT Eval and Treat    Presenting Problem: Weakness, AMS, ESRD  Reason for Therapy: Mobility Dysfunction and History:   Diagnosis Date   • AVF (arteriovenous fistula) ([facility] [address] 3/6/2017   • Cancer (HCC)     prostate   • CKD (chronic kidney disease)    • Dialysis patient [facility])    • Disorder of thyroid    • Essential hypertension    • High blood pressure    • High chol TESTS                                    NEUROLOGICAL FINDINGS                      ACTIVITY TOLERANCE                         O2 WALK                  AM-PAC '6-Clicks' INPATIENT SHORT FORM - BASIC MOBILITY  How much difficulty does the patient currently addressed(wrist restraints re-applied, rn aware)    ASSESSMENT   Patient is a 80year old male admitted on 1/15/2020 for confusion, ESRD, brain mass.  S/P ligation of av fistula and excision of infected aneurysm of fistula 1/16/20, required emergent return is able to ambulate 300 feet with assist device: walker - rolling at assistance level: supervision     Goal #4 Pt will negotiate 2 step stoop with min assist.   Goal #5    Goal #6    Goal Comments: Goals established on 1/18/2020

## 2020-01-18 NOTE — PROGRESS NOTES
Contacted by RN, patient agitated, wanting out of restraints  Patient is at risk for injury to self    Plan:   Add Seroquel 12.5 nightly as needed  If ineffective, Haldol IV as needed  Discussed with RN  Ashish Grover MD

## 2020-01-18 NOTE — PLAN OF CARE
Assumed care of patient at 07:00 am. VSS. A/O remains difficult to determine. Patient's daughter refusing perma cath ordered, nephrology notified. MRI ordered awaiting completion.  Nephrology to be notified once completed for additional HD to be ordere Progressing     Problem: HEMATOLOGIC - ADULT  Goal: Maintains hematologic stability  Description  INTERVENTIONS  - Assess for signs and symptoms of bleeding or hemorrhage  - Monitor labs and vital signs for trends  - Administer supportive blood products/fa

## 2020-01-18 NOTE — PROGRESS NOTES
[address] [facility] Progress Note    Zamzam Hummel Patient Status:  Inpatient    1932 MRN [id number]   [facility] 7NE-A Attending Taryn Jackson, DO   Hosp Day # 3 PCP Chato Escobar MD         Subjective:  Zamzam Hummel is a(n) 80 precautions  Depacon 250 mg BID IV for seizure prophylaxis   Decadron per NS, poor surgical candidate - radiation onc consulted   Minimize sedatives  Nephrology, NS, Oncology, Palliative following, appreciate recs    MRI pending, may be too agitated sayda

## 2020-01-18 NOTE — CONSULTS
[facility] Pharmacy Note:  Renal Adjustment for cefazolin (ANCEF)    Sabi Silver is a 80year old male who has been prescribed cefazolin (ANCEF) 1 gm every 8 hrs. CrCl is estimated creatinine clearance is 6.1 mL/min (A) (based on SCr of 7.53 mg/dL (H)).  so the d

## 2020-01-18 NOTE — PLAN OF CARE
Assumed care @ 0700. Pt a/o SANDRA, VSS. Pt following some commands. Pt calm today. In/out sleep. Open eyes spontaneously. Minimal verbal response. Tele SR. No acute respiratory distress noted. Denies any pain. Pt ambulated with PT in hallway.   (+ encourage patient's normal rest cycle i.e. lights off, TV off, minimize noise and interruptions  - Encourage family to assist in orientation and promotion of home routines  Outcome: Progressing     Problem: HEMATOLOGIC - ADULT  Goal: Maintains hematologic

## 2020-01-18 NOTE — PROGRESS NOTES
[facility]  Nephrology Progress Note    Char Ellis Patient Status:  Inpatient    1932 MRN [id number]   [facility] 7NE-A Attending Breezy Rodriguez DO   Hosp Day # 3 PCP Charlie Estrada MD       SUBJECTIVE:  Stable this AM        Physic 8. 3* 7.3*   MG  --  2.0 2.0   * 131* 132*       Recent Labs   Lab 01/15/20  1355 01/18/20  0600   ALT 11*  --    AST 15  --    ALB 3.2*  --    LDH  --  197       Recent Labs   Lab 01/16/20  2242 01/17/20  0636   PGLU 165* 144*       Meds:   Delma Riding ESRD- HD to cont MWF per usual routine. Had infected AVF and now s/p ligation. Has temp cath in place with next HD on Monday    #2. Brain mass- ongoing eval per neuro services    #3. AMS- multifactorial, persists    #4. Anemia- due to ESRD.   Will hold

## 2020-01-18 NOTE — PROGRESS NOTES
[facility]  Progress Note     Elena Simon Patient Status:  Inpatient    1932 MRN [id number]   [facility] 7NE-A Attending Obie Ruby DO   Hosp Day # 3 PCP Jessica Sierra MD     Chief Complaint: weakness    S: Agitated overnight Clearance: 6.1 mL/min (A) (based on SCr of 7.53 mg/dL (H)). No results for input(s): PTP, INR in the last 168 hours. No results for input(s): TROP, CK in the last 168 hours. Imaging: Imaging data reviewed in Epic.     Medications:   • ceFAZoli

## 2020-01-19 NOTE — PROGRESS NOTES
[facility]  Progress Note     Burton Hernandez Patient Status:  Inpatient    1932 MRN [id number]   [facility] 7NE-A Attending Diego Michael, DO   Hosp Day # 4 PCP Haley Child MD     Chief Complaint: weakness    S: Agitated this morn Estimated Creatinine Clearance: 5.9 mL/min (A) (based on SCr of 7.53 mg/dL (H)). No results for input(s): PTP, INR in the last 168 hours. No results for input(s): TROP, CK in the last 168 hours. Imaging: Imaging data reviewed in Epic. option is hospice.     Quality:  · DVT Prophylaxis: Heparin  · CODE status: DNR  · Chavez: no     Plan of care discussed with patient/RN

## 2020-01-19 NOTE — PLAN OF CARE
Assumed care @ 0700. Pt a/o MAHENDRA FLORES. Tele SR. HR 77. No acute respiratory distress noted. Denies any pain. Pt resting in bed.    (+) BM, loose. Stool sample collected and sent for C. Diff. Contact plus isolation initiated until C.  Diff ruled ADULT  Goal: Maintains hematologic stability  Description  INTERVENTIONS  - Assess for signs and symptoms of bleeding or hemorrhage  - Monitor labs and vital signs for trends  - Administer supportive blood products/factors, fluids and medications as ordere

## 2020-01-19 NOTE — PLAN OF CARE
Assumed care. Patient resting in bed. Still wanting restraints off, but not yelling tonight. Seroquel given last night and patient resting. Awake this am wanting to eat. Plan for MRI today and then HD right after. Bed alarm in place.  Restraints still in pl

## 2020-01-19 NOTE — PROGRESS NOTES
[address] [facility] Progress Note    Mello Turner Patient Status:  Inpatient    1932 MRN [id number]   [facility] 7NE-A Attending Davian Ott, DO   Hosp Day # 4 PCP Kasi Fairchild MD         Subjective:  Mello Turner is a(n) 80 yesterday, will discuss with Dr. Mari Vega per NS, poor surgical candidate - radiation onc consulted   Minimize sedatives  Nephrology, NS, Oncology, Palliative following, appreciate recs      Rona Pelayo, [address]  1/19/

## 2020-01-19 NOTE — PROGRESS NOTES
[facility]  Nephrology Progress Note    Tabitha Rosenthal Patient Status:  Inpatient    1932 MRN [id number]   [facility] 7NE-A Attending Oscar Mckinley DO   Hosp Day # 4 PCP Ricky Live MD       SUBJECTIVE:  Dialysis note, remains [facility] 24* 42*   CREATSERUM 14.00* 5.89* 7.53*   CA 7.8* 8.3* 7.3*   MG  --  2.0 2.0   * 131* 132*       Recent Labs   Lab 01/15/20  1355 01/18/20  0600   ALT 11*  --    AST 15  --    ALB 3.2*  --    LDH  --  197       Recent Labs   Lab 01/16/20  2242 01/1 routine. Had infected AVF and now s/p ligation. Has temp cath in place and is being dialyzed today given the fact that he received gadolinium earlier. Usual HD tomorrow as well    #2. Brain mass- ongoing eval per neuro services.      #3.  AMS- multifact

## 2020-01-20 NOTE — PROGRESS NOTES
[address] [address] [address] Follow Up    Sabi Silver Patient Status:  Inpatient    1932 MRN [id number]   [facility] 7NE-A Attending Karla Puri, DO   Hosp Day # 5 PCP Mitch Mejia MD     Date of visit:  2020  Day 5 of hospit Dictated by: Concha Suresh MD on 1/19/2020 at 11:19     Approved by: Concha Suresh MD on 1/19/2020 at 11:25            Vital Signs:  Blood pressure 134/61, pulse 76, temperature 97.6 °F (36.4 °C), temperature source Axillary, resp.  rate 18, height 66\" mass.   Fears:  Fears pt might refuse HD or that he might be confused, combative at HD and asks what to do in that case.  Reviewed PC role is to support when medical interventions are refused or no longer safely implemented, or no longer in the best interes mass, left     Elevated CEA     Anemia, unspecified type     Membranous glomerulonephritis     CKD (chronic kidney disease), stage V (HCC)     ESRD (end stage renal disease) ([facility] [address]     Nephrotic range proteinuria     AVF (arteriovenous fistula) ([facility] [address]     An service to participate in the care of Josefina [address]. Palliative Care Service will sign off as GOC are established.       MARY ALICE Chavez, [facility]  Palliative Care  [phone]    1/20/2020  5:22 PM

## 2020-01-20 NOTE — PROGRESS NOTES
[facility]  Progress Note     Spence Poles Patient Status:  Inpatient    1932 MRN [id number]   [facility] 7NE-A Attending Kody Cho, DO   Hosp Day # 5 PCP Felix Malcolm MD     Chief Complaint: weakness    S: More alert and edvin (based on SCr of 7.53 mg/dL (H)). No results for input(s): PTP, INR in the last 168 hours. No results for input(s): TROP, CK in the last 168 hours. Imaging: Imaging data reviewed in Epic.     Medications:   • valproate  250 mg Intravenous Q12H Daughter not ready for hospice and is agreeable to permacath placement for continued HD but does not want to pursue further workup of brain mass.  Perhaps Palliative Care disposition with Steroids/AED per Neuro and placement of permacath?      Quality:  · D

## 2020-01-20 NOTE — PLAN OF CARE
Assumed care at [address] P[address], confused, mandarin speaking  Agitated at times  Aspiration & R arm precautions maintained  Currently on RA, spO2 90's  R arm ace bandage in place, c/d/I  Plan is for dialysis tomorrow  Critical access hospitalius notified  Also, palliative care Assess for signs and symptoms of bleeding or hemorrhage  - Monitor labs and vital signs for trends  - Administer supportive blood products/factors, fluids and medications as ordered and appropriate  - Administer supportive blood products/factors as ordered

## 2020-01-20 NOTE — PROGRESS NOTES
[facility]  Nephrology Progress Note    Erika Lau Attending:  Min Chand,        Assessment and Plan:    1) ESRD- HD to cont MWF per usual routine s/p igation of infected AVF- now with temp cath.  HD today per usula routine- will proceed with pe mL, 100 mL, Intravenous, PRN  QUEtiapine Fumarate (SEROQUEL) partial tab 12.5 mg, 12.5 mg, Oral, Nightly PRN  haloperidol lactate (HALDOL) 5 MG/ML injection 1 mg, 1 mg, Intravenous, Q6H PRN  atorvastatin (LIPITOR) tab 40 mg, 40 mg, Oral, Nightly  Levothyro

## 2020-01-21 NOTE — CM/SW NOTE
Pt is ready for d/c today. Pt is going to return home with [facility] - Neyda Nielsen from [facility] said palliative care is all set up.

## 2020-01-21 NOTE — PROCEDURES
[facility]  Procedure Note    Bren Aquino Patient Status:  Inpatient    1932 MRN [id number]   Location [address] Attending Jonny Gonzalez, [address] [facility] Day # 6 PCP Roni Edwards MD     Procedure:  Tunneled dialysis catheter

## 2020-01-21 NOTE — PROGRESS NOTES
Assumed care of pt at 1900. Alert, oriented to person and place. Follows commands. Mostly calm and cooperative but can be agitated when direct care is provided. Dialysis completed, 2L off, pt tolerated well. Denies any pain.  Plan for permacath placement to

## 2020-01-21 NOTE — PROGRESS NOTES
[facility]  Nephrology Progress Note    Josephdanella 42 Attending:  Breanna Ron,        Assessment and Plan:    1) ESRD- HD to cont MWF per usual routine s/p ligation of infected AVF- for permcath today; continue Ancef     2) Brain mass- no further w/ PRN  QUEtiapine Fumarate (SEROQUEL) partial tab 12.5 mg, 12.5 mg, Oral, Nightly PRN  haloperidol lactate (HALDOL) 5 MG/ML injection 1 mg, 1 mg, Intravenous, Q6H PRN  atorvastatin (LIPITOR) tab 40 mg, 40 mg, Oral, Nightly  Levothyroxine Sodium tab 125 mcg,

## 2020-01-21 NOTE — PHYSICAL THERAPY NOTE
PHYSICAL THERAPY TREATMENT NOTE - INPATIENT    Room Number: 2224/2491-B     Session: 1   Number of Visits to Meet Established Goals: 3    Presenting Problem: Weakness, AMS, ESRD    Problem List  Principal Problem:    Confused  Active Problems:    ESRD (en Standing: Fair -  Dynamic Standing: Poor +    ACTIVITY TOLERANCE                         O2 WALK                    AM-PAC '6-Clicks' INPATIENT SHORT FORM - BASIC MOBILITY  How much difficulty does the patient currently have. ..  -   Turning over in bed (in Standardized Assessment  NA    Axillary Transfers/Environmental Barriers    Toilet/Commode Transfers: NA  Stairs: YOVANNY  Curb Step: NA      THERAPEUTIC EXERCISES  Lower Extremity Alternating marching  Ankle pumps     Upper Extremity see OT note     Posit

## 2020-01-21 NOTE — PROGRESS NOTES
[facility]  Progress Note     Angel Awan Patient Status:  Inpatient    1932 MRN [id number]   [facility] 7NE-A Attending Francois Jamison, DO   Hosp Day # 6 PCP Brien Slaughter MD     Chief Complaint: weakness    S: More alert and edvin --        Estimated Creatinine Clearance: 5.7 mL/min (A) (based on SCr of 7.53 mg/dL (H)). Recent Labs   Lab 01/21/20  0954   PTP 14.2   INR 1.05       No results for input(s): TROP, CK in the last 168 hours.          Imaging: Imaging data reviewed in E steroid effect    Plan of Care: Will be stable for discharge once Permacath is placed, hopefull today.     Quality:  · DVT Prophylaxis: Heparin  · CODE status: DNR  · Chavez: no     Plan of care discussed with RN's

## 2020-01-21 NOTE — PLAN OF CARE
Assumed pt care at 0730  VSS- NSR, ST at times  Pt denies pain  Unable to assess orientation level- pt refusing to answer questions or follow commands  Permacath placed  Family concerned about d/c-ing pt home  Dr. Javier Perez notified for need for dialysis order

## 2020-01-21 NOTE — PROGRESS NOTES
[facility]  Neurosurgery Progress Note    Daryl Young Patient Status:  Inpatient    1932 MRN [id number]   [facility] 7NE-A Attending Vira Ramirez, DO   Hosp Day # 6 PCP Jennifer Vaughn MD     Chief Complaint:  AMS    Subjective:  Pt

## 2020-01-22 NOTE — DIETARY NOTE
[address]     Admitting diagnosis:  Weakness generalized [R53.1]  Confused [R41.0]  [facility] [N18.6]    Ht: 167.6 cm (5' 6\")  Wt: 61.5 kg (135 lb 9.6 oz).  This is 95 % of IBW  Body mass

## 2020-01-22 NOTE — PROGRESS NOTES
[facility] Note: Route Optimization for Valproate Injection    Patient is currently on Valproate IVPB 250 mg every 12 hours.   The patient meets the criteria to convert to the oral equivalent as established by the IV to Oral conversion protocol approved b

## 2020-01-22 NOTE — PROGRESS NOTES
[facility]  Nephrology Progress Note    Josephdanella 42 Attending:  Breezy Rodriguez DO       Assessment and Plan:    1) ESRD- HD to cont MWF per usual routine s/p ligation of infected AVF- for permcath today; continue Ancef     2) Brain mass- no further w/ minibag/add-van, 1 g, Intravenous, Q24H  Albumin Human (ALBUMINAR) 25 % solution 100 mL, 100 mL, Intravenous, PRN  QUEtiapine Fumarate (SEROQUEL) partial tab 12.5 mg, 12.5 mg, Oral, Nightly PRN  haloperidol lactate (HALDOL) 5 MG/ML injection 1 mg, 1 mg, In

## 2020-01-22 NOTE — CM/SW NOTE
HH order and F2F written for [facility]. Order for [facility] [place] [facility]"" is in. Pt should d/c home today.

## 2020-01-22 NOTE — PROGRESS NOTES
Patient seen and examined. Medically cleared for discharge, if all involved specialists' consent for discharge.     Jack Gibbons MD  [facility]  Internal Medicine Hospitalist  Cell [phone]

## 2020-01-22 NOTE — PHYSICAL THERAPY NOTE
PT treatment attempted X2. First attempt patient eating and not acknowledging patient in room or willing to get up with therapist. 2nd attempt RN stating patient receiving dialysis. Will reattempt as appropriate.

## 2020-01-22 NOTE — PLAN OF CARE
Assumed care at [address]. AOx3. Denies any pain or discomfort. Ate 100% dinner. Continue on IV Depacon. Right arm staples in place MANUEL. Left chest HD catheter dressing C/D/I. Will have HD today. PT re-eval pending. Plan of care discussed with pt.  Call

## 2020-01-22 NOTE — OCCUPATIONAL THERAPY NOTE
OCCUPATIONAL THERAPY EVALUATION - INPATIENT     Room Number: 7980/7437-N  Evaluation Date: 1/21/2020  Type of Evaluation: Initial  Presenting Problem: generalized weakness, confusion     Physician Order: IP Consult to Occupational Therapy  [facility] care planning/counseling discussion      Past Medical History  Past Medical History:   Diagnosis Date   • AVF (arteriovenous fistula) ([facility] [address] 3/6/2017   • Cancer (HCC)     prostate   • CKD (chronic kidney disease)    • Dialysis patient [facility])    • Jose Francisco Blandon wanting to eat, difficult to engage         [address] [facility]  Will formally assess next session, pt unwilling to have B shoulders tested but [place] for bilateral  strength and elbow flex     COORDINATION  Gross Motor    WFL    Fine questions and concerns addressed; Alarm set    ASSESSMENT     Patient is a 80year old male admitted on 1/15/2020 for weakness and confusion. Complete medical history and occupational profile noted above.  Functional outcome measures completed include AM-PAC transfer from bed to chair:  with supervision  Patient will transfer from supine to sit:  with supervision  Patient will transfer from sit to stand:  with supervision  Patient will transfer to toilet:  with supervision    UE Exercise Program Goal  Patient

## 2020-01-22 NOTE — CM/SW NOTE
Pt is an 79 yo male admitted for weakness generalized. Pt is  and lives with his dtr Augusto. She works from home and is with him most of the time. Dtr has a private duty caregiver who comes to bathe pt 2x/wk for 1hr/day.   Pt also goes to hemod

## 2020-01-23 NOTE — DISCHARGE SUMMARY
[facility]  DISCHARGE SUMMARY     Angel Awan Patient Status:  Inpatient    1932 MRN [id number]   [facility] 7NE-A Attending Jey Bryant MD   Hosp Day # 7 PCP Brien Slaughter MD     Date of Admission: 1/15/2020  Date of Dischar TCM Follow-Up Recommendation:  LACE > 58:  High Risk of readmission after discharge from the hospital.    Procedures during hospitalization:   • See above    Incidental or significant findings and recommendations (brief descriptions):  • none    Lab/T known as:  LOPRESSOR              Where to Get Your Medications      Please  your prescriptions at the location directed by your doctor or nurse    Bring a paper prescription for each of these medications  · dexamethasone 2 MG Tabs  · divalproex Sod

## 2020-01-23 NOTE — PROGRESS NOTES
Patient medically cleared for dc   Pts daughter discussed with social work this morning that she is not ready for patient to return. SW provided resources for caregivers   3643- patients daughter called that she is ready for patient to return home.    Medic

## 2020-01-23 NOTE — CM/SW NOTE
01/23/20 0900   Discharge disposition   Expected discharge disposition Home-Health   Name of Facillity/Home Care/Hospice [facility]   Additional Home Care/Hospice Provider   ([facility])   Outpatient services Palliative   Home services after discha

## 2020-01-23 NOTE — PLAN OF CARE
Discharged home with daughter. Reviewed d/c instructions including new medications with daughter. Prescriptions given. All belongings sent with. [facility]

## 2020-01-23 NOTE — PROGRESS NOTES
[facility]  Progress Note     Everett Warren Patient Status:  Inpatient    1932 MRN [id number]   [facility] 7NE-A Attending Marcos Alonso, DO   Hosp Day # 7 PCP Gudelia Zhou MD     Chief Complaint: weakness    S: calm, no complaint Imaging data reviewed in Epic.     Medications:   • divalproex Sodium  250 mg Oral 2 times per day   • [START ON 1/23/2020] dexamethasone  2 mg Oral 2 times per day   • ceFAZolin  1 g Intravenous Q24H   • atorvastatin  40 mg Oral Nightly   • Levothyroxine S

## 2020-09-04 NOTE — OPERATIVE REPORT
[facility]    PATIENT'S NAME: VALERIANO OLIVIER   ATTENDING PHYSICIAN: Jocelyn Underwood DO   OPERATING PHYSICIAN: Janett Pettit M.D.    PATIENT ACCOUNT#:   [de-identified]    LOCATION:  [address]  MEDICAL RECORD #:   [id number]       DATE OF BIRTH:  [birth year] Patient Education     Migraine Headache  This often severe type of headache is different from other types of headaches in that symptoms other than pain occur with the headache. Nausea and vomiting, lightheadedness, sensitivity to light (photophobia), and other visual disturbances are common migraine symptoms. The pain may last from a few hours to several days. It is not clear why migraines occur but certain factors called “triggers” can raise the risk of having a migraine attack. A migraine may be triggered by emotional stress or depression, or by hormone changes during the menstrual cycle. Other triggers include birth control pills, overuse of migraine medicines, alcohol or caffeine, foods with tyramine (such as aged cheese and wine), eyestrain, weather changes, missed meals, or too little or too much sleep.  Home care  Follow these tips when taking care of yourself at home:  · Don’t drive yourself home if you were given pain medicine for your headache or are having visual symptoms. Instead, have someone else drive you home. Try to sleep when you get home. You should feel much better when you wake up.  · Cold can help ease migraine symptoms. Put an ice pack on your forehead or at the base of your skull. Put heat on the back of your neck to help ease any neck spasm.  · Drink only clear liquids or eat a light diet until your symptoms get better. This will help you avoid nausea and vomiting.  How to prevent migraines  Pay attention to what seems to trigger your headache. Try to avoid the triggers when you can. If you have frequent headaches, consider keeping a headache diary. In it, write down what you were doing, feeling, or eating in the hours before each headache. Show this to your healthcare provider to help find the cause of your headaches.  If stress seems to be a trigger for your headaches, figure out what is causing stress in your life. Learn new ways to handle your stress. Ideas include regular exercise,  Because of the infection and his prognosis, I wanted to try close some of the skin. We loosely closed the skin with nylon. The patient was then taken to Recovery in stable condition.       Dictated By Scotty Kelley M.D.  d: 01/16/2020 17:36:30  t: 56 biofeedback, self-hypnosis, yoga, and meditation. Talk with your healthcare provider to find out more information about managing stress. Many books and digital media are also available on this subject.  Tyramine is a substance found in many foods. It can trigger a migraine in some people. These foods contain tyramine:  · Chocolate  · Yogurt  · All cheeses, but especially aged cheeses  · Smoked or pickled fish and meat, including herring, caviar, bologna, pepperoni, and salami  · Liver  · Avocados  · Bananas  · Figs  · Raisins  · Red wine  Try staying away from these foods for 1 to 2 months to see if you have fewer headaches.  How to treat future headaches  · Take time out at the first sign of a headache, if possible. Find a quiet, dark, comfortable place to sit or lie down. Let yourself relax or sleep.  · Put an ice pack on your forehead or on the area of greatest pain. A heating pad and massage may help if you are having a muscle spasm and tightness in your neck.  · If you have been prescribed a medicine to stop a migraine headache, use this at the first warning sign of the headache for best results. First signs may be an aura or pain.  · If you need to take medicine often for your migraine, talk with your healthcare provider about other ways to prevent your headaches.  Follow-up care  Follow up with your healthcare provider, or as advised. Talk with your provider if you have frequent headaches. He or she can figure out a treatment plan. Ask if you can have medicine to take at home the next time you get a bad headache. This may keep you from having to visit the emergency department in the future. You may need to see a headache specialist (neurologist) if you continue to have headaches.  When to seek medical advice  Call your healthcare provider right away if any of these occur:  · Your head pain gets worse, or doesn’t get better within 24 hours  · You can’t keep liquids down (repeated vomiting)  · Pain in your  sinuses, ears, or throat  · Fever of 100.4º F (38º C) or higher, or as directed by your healthcare provider  · Stiff neck  · Extreme drowsiness, confusion, or fainting  · Dizziness, or dizziness with spinning sensation (vertigo)  · Weakness in an arm or leg, or on one side of your face  · Difficulty talking or seeing  Date Last Reviewed: 8/1/2016  © 9777-8638 [employer]. [address] [place] [address], [place] [address]. All rights reserved. This information is not intended as a substitute for professional medical care. Always follow your healthcare professional's instructions.

## 2024-09-13 NOTE — PLAN OF CARE
Problem: Impaired Activities of Daily Living  Goal: Achieve highest/safest level of independence in self care  Description  Interventions:  - Assess ability and encourage patient to participate in ADLs to maximize function  - Promote sitting position i Patient seen, chart reviewed, case discussed with team.  I saw the patient with the resident, discussed case with resident and reviewed all sections of the note, made changes where appropriate and agree with it as written.

## 2024-12-10 NOTE — PROGRESS NOTES
[facility]  Progress Note    Rose Ordaz Patient Status:  Emergency    1932 MRN [id number]   Location [facility] [facility] Attending Scott Richter MD   Hosp Day # 2 PCP Leonila Lane MD     Chart reviewed  S/p OR with ligation o day        Physical Exam:  Vital signs: Blood pressure 127/61, pulse 80, temperature 98.1 °F (36.7 °C), temperature source Axillary, resp. rate 19, height 66\", weight 136 lb 14.5 oz (62.1 kg), SpO2 100 %.   General: Awake; disoriented ; in restraints curre allowing me to participate in this patient's care. Please feel free to call me with any questions or concerns.     Reggie Paniagua MD  1/17/2020 Prophylactic measure

## (undated) DEVICE — KENDALL SCD EXPRESS SLEEVES, KNEE LENGTH, MEDIUM: Brand: KENDALL SCD

## (undated) DEVICE — SUTURE PROLENE 6-0 C-1

## (undated) DEVICE — SUTURE SILK 3-0

## (undated) DEVICE — STRL PENROSE DRAIN 18" X 1/4": Brand: CARDINAL HEALTH

## (undated) DEVICE — SOL  .9 1000ML BTL

## (undated) DEVICE — GAUZE SPONGES,12 PLY: Brand: CURITY

## (undated) DEVICE — GOWN SURG AERO CHROME XXL

## (undated) DEVICE — SYRINGE 20CC LL TIP

## (undated) DEVICE — GEL AQUASONIC 100 20GR

## (undated) DEVICE — GLOVE BIOGEL M SURG SZ 71/2

## (undated) DEVICE — HEMOCLIP MED 24 CLIP/CARTRIDGE

## (undated) DEVICE — SOL  .9 500ML

## (undated) DEVICE — BASIC DOUBLE BASIN 1-LF: Brand: MEDLINE INDUSTRIES, INC.

## (undated) DEVICE — STERILE POLYISOPRENE POWDER-FREE SURGICAL GLOVES: Brand: PROTEXIS

## (undated) DEVICE — SUTURE ETHIBOND EXCEL 2-0 SH

## (undated) DEVICE — TRANSPOSAL ULTRAFLEX DUO/QUAD ULTRA CART MANIFOLD

## (undated) DEVICE — Device

## (undated) DEVICE — INDICATED FOR SURGICAL CLAMPING DURING CARDIOVASCULAR PERIPHERAL VASCULAR, AND GENERAL SURGERY.: Brand: SOFT/FIBRA® SPRING CLIP

## (undated) DEVICE — SYRINGE 10ML LL TIP

## (undated) DEVICE — SPONGE STICK WITH PVP-I: Brand: KENDALL

## (undated) DEVICE — SPONGE: SPECIALTY PEANUT XR 100/CS: Brand: MEDICAL ACTION INDUSTRIES

## (undated) DEVICE — VIOLET BRAIDED (POLYGLACTIN 910), SYNTHETIC ABSORBABLE SUTURE: Brand: COATED VICRYL

## (undated) DEVICE — INTENDED TO BE USED TO OCCLUDE, RETRACT AND IDENTIFY ARTERIES, VEINS, TENDONS AND NERVES IN SURGICAL PROCEDURES: Brand: STERION®  VESSEL LOOP

## (undated) DEVICE — SUTURE VICRYL 3-0 SH

## (undated) DEVICE — PROXIMATE SKIN STAPLERS (35 WIDE) CONTAINS 35 STAINLESS STEEL STAPLES (FIXED HEAD): Brand: PROXIMATE

## (undated) DEVICE — BREAST-HERNIA-PORT CDS-LF: Brand: MEDLINE INDUSTRIES, INC.

## (undated) DEVICE — DECANTER BAG 9": Brand: MEDLINE INDUSTRIES, INC.

## (undated) DEVICE — SKIN AFFIX .4ML

## (undated) DEVICE — SUTURE MONOCRYL 4-0 PS-2

## (undated) DEVICE — SUTURE VICRYL 2-0

## (undated) DEVICE — CV PACK-LF: Brand: MEDLINE INDUSTRIES, INC.

## (undated) DEVICE — CHLORAPREP 26ML APPLICATOR

## (undated) DEVICE — SUTURE PROLENE 6-0 BV-1

## (undated) DEVICE — 3M™ TEGADERM™ TRANSPARENT FILM DRESSING, 1626W, 4 IN X 4-3/4 IN (10 CM X 12 CM), 50 EACH/CARTON, 4 CARTON/CASE: Brand: 3M™ TEGADERM™

## (undated) DEVICE — HEMOCLIP HORIZON SM MULTI

## (undated) DEVICE — STANDARD HYPODERMIC NEEDLE,POLYPROPYLENE HUB: Brand: MONOJECT

## (undated) DEVICE — GAUZE SPONGES,USP TYPE VII GAUZE, 12 PLY: Brand: CURITY

## (undated) NOTE — LETTER
Consent to Procedure/Sedation    Date: 1/15/2020    Time: _______________    1. I authorize the performance upon Dayne Rocha the following:    Temporary Dialysis Catheter Insertion    2.  I authorize Dr. Delta Simental (and whomever is designated as the doctor’s Witness: ____________________     Date: ______________    Printed: 1/15/2020   4:44 PM    Patient Name: Brittnee Mary        : 1932       Medical Record #: [id number]

## (undated) NOTE — LETTER
Consent to Procedure/Sedation    Date: __________________    Time: _______________    1. I authorize the performance upon Dayne Aj the following:       Insertion Permanent Dialysis Catheter    2.  I authorize Dr. Lashay Dempsey (and whomever is designated as ___________________________    ___________________    Witness: ____________________     Date: ______________    Printed: 2020   7:42 AM    Patient Name: Char Ellis        : 1932       Medical Record #: [id number]

## (undated) NOTE — IP AVS SNAPSHOT
[facility] [place] [address] [place], [address] ~ [phone]                Discharge Summary   6/19/2017    Daryl Young           Admission Information        Provider Department    6/19/2017 Cirilo Bingham MD  7ne-A         Than Take 1 tablet (0.8 mg total) by mouth daily. Patt Olivares                           Vitamin D2 2000 units Tabs        Take by mouth.                                     Discharge References/Attachments     DEEP VEIN THROMBOSIS, TREATING (ENGLISH)    DE Metabolic Lab Results  (Last result in the past 90 days)    HgbA1C Glucose BUN Creatinine Calcium Alkaline Phosph AST    -- (06/20/17)  75 (06/20/17)  82 (H) (06/20/17)  7.01 (H) (06/20/17)  8.3 (06/19/17)  70 (06/19/17)  20      Metabolic Lab Results  (La Summaries. If you've been to the Emergency Department or your doctor's office, you can view your past visit information in [facility] by going to Visits < Visit Summaries. [facility] questions? Call [phone] for help.   [facility] is NOT to be used for urge What to report to your healthcare team:  Changes in thinking, confusion, skin swelling, palpitations, dizziness           All Other Medications     NEPHRO-CHEYANNE 0.8 MG Oral Tab    Ergocalciferol (VITAMIN D2) 2000 UNITS Oral Tab

## (undated) NOTE — LETTER
Keisha Reddy [address] [facility]  [place], [address] [facility]    Consent for Operation  Date: __________________                                Time: _______________    1.  I authorize the performance upon Dayne Rocha the following operation:  Procedure(s): procedure has been videotaped, the surgeon will obtain the original videotape. The hospital will not be responsible for storage or maintenance of this tape.   7. For the purpose of advancing medical education, I consent to the admittance of observers to the STATEMENTS REQUIRING INSERTION OR COMPLETION WERE FILLED IN.     Signature of Patient:   ___________________________    When the patient is a minor or mentally incompetent to give consent:  Signature of person authorized to consent for patient: ____________ drugs/illegal medications). Failure to inform my anesthesiologist about these medicines may increase my risk of anesthetic complications. iv. If I am allergic to anything or have had a reaction to anesthesia before.   3. I understand how the anesthesia med I have discussed the procedure and information above with the patient (or patient’s representative) and answered their questions. The patient or their representative has agreed to have anesthesia services.     _______________________________________________

## (undated) NOTE — Clinical Note
2017    Patient: Alverto Fowler  : 1932 Visit date: 2017    Dear  Dr. Venecia Morris MD,    Thank you for referring Alverto Fowler to my practice. Please find my assessment and plan below.         Assessment   Inguinal hernia of right side without obstruc Clinical examination reveals his abdomen to be soft, nondistended, nontender, good bowel sounds. His BMI is 20.51. He has no evidence of ascites. Liver and spleen are not palpable. He has a giant right inguinal hernia.   It is occupying his entire right

## (undated) NOTE — Clinical Note
FYI, TCM call made, see notes. NCM sent message to PCP for medication clarification.  NCM confirmed TCM HFU on 7/3/17

## (undated) NOTE — LETTER
[facility]  Vanessa Carmen [id number] [facility], [address] [place] [address]    Consent for Operation    Date: __________________    Time: _______________    1.  I authorize the performance upon Dayne Rocha the following operation:    Procedure(s):  Repair incarcerated unil procedure has been videotaped, the surgeon will obtain the original videotape. The hospital will not be responsible for storage or maintenance of this tape.     6. For the purpose of advancing medical education, I consent to the admittance of observers to t STATEMENTS REQUIRING INSERTION OR COMPLETION WERE FILLED IN.     Signature of Patient:   ___________________________    When the patient is a minor or mentally incompetent to give consent:  Signature of person authorized to consent for patient: ____________ drugs/illegal medications). Failure to inform my anesthesiologist about these medicines may increase my risk of anesthetic complications. · If I am allergic to anything or have had a reaction to anesthesia before.     3. I understand how the anesthesia med I have discussed the procedure and information above with the patient (or patient’s representative) and answered their questions. The patient or their representative has agreed to have anesthesia services.     _______________________________________________

## (undated) NOTE — ED AVS SNAPSHOT
Brittnee Mary   MRN: [id number]    Department:  [facility] Emergency Department   Date of Visit:  4/6/2019           Disclosure     Insurance plans vary and the physician(s) referred by the ER may not be covered by your plan.  Please contact your ins tell this physician (or your personal doctor if your instructions are to return to your personal doctor) about any new or lasting problems. The primary care or specialist physician will see patients referred from the [facility] Emergency Department.  Paige Schmidt

## (undated) NOTE — LETTER
17    Patient: Alverto Fowler  : 1932 Visit date: 2017    Dear  Dr. Joshua Powell MD,    Thank you for referring Alverto Fowler to my practice. Please find my assessment and plan below.          Assessment   Recurrent unilateral inguinal hernia with

## (undated) NOTE — MR AVS SNAPSHOT
[facility]  [address]  [facility] [address] [address] [phone]               Thank you for choosing us for your health care visit with Jem Maki MD.  We are glad to serve you and happy to provide you with this summary of you He has been diagnosed with pancytopenia. He has hypertension. 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 Clinical examination reveals his abdomen to be soft, nondistended, nontender, good bowel sounds. His BMI is 20.51.   He has no evidence of Take 1-2 tablets by mouth every 4 (four) hours as needed for Pain.    Commonly known as:  NORCO           Levothyroxine Sodium 125 MCG Tabs   Take 1 tablet (125 mcg total) by mouth before breakfast.   Commonly known as:  SYNTHROID, [address]

## (undated) NOTE — ED AVS SNAPSHOT
Erika Lau   MRN: [id number]    Department:  [facility] Emergency Department   Date of Visit:  12/14/2018           Disclosure     Insurance plans vary and the physician(s) referred by the ER may not be covered by your plan.  Please contact your i tell this physician (or your personal doctor if your instructions are to return to your personal doctor) about any new or lasting problems. The primary care or specialist physician will see patients referred from the [facility] Emergency Department.  Compa Alegria

## (undated) NOTE — LETTER
[facility] [address], [address]  Consent for Procedure/Sedation    Date: 01/16/2020    Time: 20:15 pm      1. I authorize the performance upon Dayne Rocha the following: Temp to tunnel HD cath exchange of L IJ/chest  2.  I Tiff Lomeli ________________________________    ___________________    Witness: _________________________      Date: ___________________    Printed: 2020   8:09 PM  Patient Name: Adrienne Power        : 1932       Medical Record #: [id number]